# Patient Record
Sex: FEMALE | Race: WHITE | NOT HISPANIC OR LATINO | Employment: UNEMPLOYED
[De-identification: names, ages, dates, MRNs, and addresses within clinical notes are randomized per-mention and may not be internally consistent; named-entity substitution may affect disease eponyms.]

---

## 2017-03-14 ENCOUNTER — RECORDS - HEALTHEAST (OUTPATIENT)
Dept: ADMINISTRATIVE | Facility: OTHER | Age: 52
End: 2017-03-14

## 2018-02-11 ENCOUNTER — COMMUNICATION - HEALTHEAST (OUTPATIENT)
Dept: TELEHEALTH | Facility: CLINIC | Age: 53
End: 2018-02-11

## 2018-02-11 ENCOUNTER — OFFICE VISIT - HEALTHEAST (OUTPATIENT)
Dept: FAMILY MEDICINE | Facility: CLINIC | Age: 53
End: 2018-02-11

## 2018-02-11 DIAGNOSIS — H65.91 RIGHT OTITIS MEDIA WITH EFFUSION: ICD-10-CM

## 2018-04-23 ENCOUNTER — RECORDS - HEALTHEAST (OUTPATIENT)
Dept: ADMINISTRATIVE | Facility: OTHER | Age: 53
End: 2018-04-23

## 2018-04-24 ENCOUNTER — OFFICE VISIT - HEALTHEAST (OUTPATIENT)
Dept: FAMILY MEDICINE | Facility: CLINIC | Age: 53
End: 2018-04-24

## 2018-04-24 ENCOUNTER — HOSPITAL ENCOUNTER (OUTPATIENT)
Dept: MAMMOGRAPHY | Facility: CLINIC | Age: 53
Discharge: HOME | End: 2018-04-24

## 2018-04-24 DIAGNOSIS — Z00.00 ROUTINE HEALTH MAINTENANCE: ICD-10-CM

## 2018-04-24 DIAGNOSIS — N95.1 HOT FLASHES, MENOPAUSAL: ICD-10-CM

## 2018-04-24 DIAGNOSIS — Z12.31 VISIT FOR SCREENING MAMMOGRAM: ICD-10-CM

## 2018-04-24 DIAGNOSIS — Z13.1 SCREENING FOR DIABETES MELLITUS: ICD-10-CM

## 2018-04-24 DIAGNOSIS — Z12.4 SCREENING FOR MALIGNANT NEOPLASM OF CERVIX: ICD-10-CM

## 2018-04-24 DIAGNOSIS — J45.30 MILD PERSISTENT ASTHMA WITHOUT COMPLICATION: ICD-10-CM

## 2018-04-24 DIAGNOSIS — Z13.220 SCREENING FOR LIPID DISORDERS: ICD-10-CM

## 2018-04-24 LAB
ANION GAP SERPL CALCULATED.3IONS-SCNC: 10 MMOL/L (ref 5–18)
BUN SERPL-MCNC: 9 MG/DL (ref 8–22)
CALCIUM SERPL-MCNC: 9.3 MG/DL (ref 8.5–10.5)
CHLORIDE BLD-SCNC: 105 MMOL/L (ref 98–107)
CHOLEST SERPL-MCNC: 145 MG/DL
CO2 SERPL-SCNC: 25 MMOL/L (ref 22–31)
CREAT SERPL-MCNC: 0.69 MG/DL (ref 0.6–1.1)
FASTING STATUS PATIENT QL REPORTED: YES
GFR SERPL CREATININE-BSD FRML MDRD: >60 ML/MIN/1.73M2
GLUCOSE BLD-MCNC: 80 MG/DL (ref 70–125)
HDLC SERPL-MCNC: 65 MG/DL
LDLC SERPL CALC-MCNC: 71 MG/DL
POTASSIUM BLD-SCNC: 3.5 MMOL/L (ref 3.5–5)
SODIUM SERPL-SCNC: 140 MMOL/L (ref 136–145)
TRIGL SERPL-MCNC: 47 MG/DL

## 2018-04-24 RX ORDER — CETIRIZINE HYDROCHLORIDE 10 MG/1
10 TABLET ORAL DAILY
Status: SHIPPED | COMMUNITY
Start: 2018-04-24

## 2018-04-24 RX ORDER — MULTIPLE VITAMINS W/ MINERALS TAB 9MG-400MCG
1 TAB ORAL DAILY
Status: SHIPPED | COMMUNITY
Start: 2018-04-24

## 2018-04-24 ASSESSMENT — MIFFLIN-ST. JEOR: SCORE: 1036.55

## 2018-04-25 LAB
HPV SOURCE: NORMAL
HUMAN PAPILLOMA VIRUS 16 DNA: NEGATIVE
HUMAN PAPILLOMA VIRUS 18 DNA: NEGATIVE
HUMAN PAPILLOMA VIRUS FINAL DIAGNOSIS: NORMAL
HUMAN PAPILLOMA VIRUS OTHER HR: NEGATIVE
SPECIMEN DESCRIPTION: NORMAL

## 2018-04-30 LAB
BKR LAB AP ABNORMAL BLEEDING: NO
BKR LAB AP BIRTH CONTROL/HORMONES: NORMAL
BKR LAB AP CERVICAL APPEARANCE: NORMAL
BKR LAB AP GYN ADEQUACY: NORMAL
BKR LAB AP GYN INTERPRETATION: NORMAL
BKR LAB AP HPV REFLEX: NORMAL
BKR LAB AP LMP: 2016
BKR LAB AP PATIENT STATUS: NORMAL
BKR LAB AP PREVIOUS ABNORMAL: NORMAL
BKR LAB AP PREVIOUS NORMAL: 2016
HIGH RISK?: NO
PATH REPORT.COMMENTS IMP SPEC: NORMAL
RESULT FLAG (HE HISTORICAL CONVERSION): NORMAL

## 2018-12-07 ENCOUNTER — HOSPITAL ENCOUNTER (OUTPATIENT)
Dept: ULTRASOUND IMAGING | Facility: CLINIC | Age: 53
Discharge: HOME | End: 2018-12-07

## 2018-12-07 ENCOUNTER — OFFICE VISIT - HEALTHEAST (OUTPATIENT)
Dept: FAMILY MEDICINE | Facility: CLINIC | Age: 53
End: 2018-12-07

## 2018-12-07 ENCOUNTER — COMMUNICATION - HEALTHEAST (OUTPATIENT)
Dept: FAMILY MEDICINE | Facility: CLINIC | Age: 53
End: 2018-12-07

## 2018-12-07 DIAGNOSIS — N95.0 POSTMENOPAUSAL BLEEDING: ICD-10-CM

## 2018-12-07 DIAGNOSIS — N83.299 COMPLEX OVARIAN CYST: ICD-10-CM

## 2018-12-20 ENCOUNTER — OFFICE VISIT - HEALTHEAST (OUTPATIENT)
Dept: OBGYN | Facility: CLINIC | Age: 53
End: 2018-12-20

## 2018-12-20 DIAGNOSIS — N83.202 LEFT OVARIAN CYST: ICD-10-CM

## 2018-12-20 DIAGNOSIS — N95.0 PMB (POSTMENOPAUSAL BLEEDING): ICD-10-CM

## 2018-12-20 ASSESSMENT — MIFFLIN-ST. JEOR: SCORE: 1066.48

## 2018-12-21 LAB
LAB AP CHARGES (HE HISTORICAL CONVERSION): NORMAL
PATH REPORT.COMMENTS IMP SPEC: NORMAL
PATH REPORT.FINAL DX SPEC: NORMAL
PATH REPORT.GROSS SPEC: NORMAL
PATH REPORT.MICROSCOPIC SPEC OTHER STN: NORMAL
PATH REPORT.RELEVANT HX SPEC: NORMAL
RESULT FLAG (HE HISTORICAL CONVERSION): NORMAL

## 2019-01-25 ENCOUNTER — HOSPITAL ENCOUNTER (OUTPATIENT)
Dept: ULTRASOUND IMAGING | Facility: CLINIC | Age: 54
Discharge: HOME | End: 2019-01-25
Attending: OBSTETRICS & GYNECOLOGY

## 2019-01-25 DIAGNOSIS — N83.202 LEFT OVARIAN CYST: ICD-10-CM

## 2019-04-12 ENCOUNTER — OFFICE VISIT - HEALTHEAST (OUTPATIENT)
Dept: FAMILY MEDICINE | Facility: CLINIC | Age: 54
End: 2019-04-12

## 2019-04-12 DIAGNOSIS — H65.91 OME (OTITIS MEDIA WITH EFFUSION), RIGHT: ICD-10-CM

## 2019-05-09 ENCOUNTER — OFFICE VISIT - HEALTHEAST (OUTPATIENT)
Dept: FAMILY MEDICINE | Facility: CLINIC | Age: 54
End: 2019-05-09

## 2019-05-09 ENCOUNTER — HOSPITAL ENCOUNTER (OUTPATIENT)
Dept: MAMMOGRAPHY | Facility: CLINIC | Age: 54
Discharge: HOME | End: 2019-05-09

## 2019-05-09 DIAGNOSIS — J45.30 MILD PERSISTENT ASTHMA WITHOUT COMPLICATION: ICD-10-CM

## 2019-05-09 DIAGNOSIS — Z12.4 SCREENING FOR MALIGNANT NEOPLASM OF CERVIX: ICD-10-CM

## 2019-05-09 DIAGNOSIS — H91.90 PERCEIVED HEARING CHANGES: ICD-10-CM

## 2019-05-09 DIAGNOSIS — Z13.1 SCREENING FOR DIABETES MELLITUS: ICD-10-CM

## 2019-05-09 DIAGNOSIS — Z00.00 ROUTINE HEALTH MAINTENANCE: ICD-10-CM

## 2019-05-09 DIAGNOSIS — Z12.31 VISIT FOR SCREENING MAMMOGRAM: ICD-10-CM

## 2019-05-09 DIAGNOSIS — Z13.220 SCREENING FOR LIPID DISORDERS: ICD-10-CM

## 2019-05-09 LAB
ANION GAP SERPL CALCULATED.3IONS-SCNC: 9 MMOL/L (ref 5–18)
BUN SERPL-MCNC: 11 MG/DL (ref 8–22)
CALCIUM SERPL-MCNC: 10 MG/DL (ref 8.5–10.5)
CHLORIDE BLD-SCNC: 105 MMOL/L (ref 98–107)
CHOLEST SERPL-MCNC: 144 MG/DL
CO2 SERPL-SCNC: 27 MMOL/L (ref 22–31)
CREAT SERPL-MCNC: 0.67 MG/DL (ref 0.6–1.1)
FASTING STATUS PATIENT QL REPORTED: YES
GFR SERPL CREATININE-BSD FRML MDRD: >60 ML/MIN/1.73M2
GLUCOSE BLD-MCNC: 76 MG/DL (ref 70–125)
HDLC SERPL-MCNC: 63 MG/DL
LDLC SERPL CALC-MCNC: 67 MG/DL
POTASSIUM BLD-SCNC: 3.9 MMOL/L (ref 3.5–5)
SODIUM SERPL-SCNC: 141 MMOL/L (ref 136–145)
TRIGL SERPL-MCNC: 71 MG/DL

## 2019-05-09 ASSESSMENT — MIFFLIN-ST. JEOR: SCORE: 1062.29

## 2019-05-10 LAB
25(OH)D3 SERPL-MCNC: 48.2 NG/ML (ref 30–80)
25(OH)D3 SERPL-MCNC: 48.2 NG/ML (ref 30–80)

## 2019-05-13 ENCOUNTER — HOSPITAL ENCOUNTER (OUTPATIENT)
Dept: ULTRASOUND IMAGING | Facility: CLINIC | Age: 54
Discharge: HOME | End: 2019-05-13

## 2019-05-13 DIAGNOSIS — N63.0 BREAST MASS: ICD-10-CM

## 2019-05-13 LAB
BKR LAB AP ABNORMAL BLEEDING: NO
BKR LAB AP BIRTH CONTROL/HORMONES: NORMAL
BKR LAB AP CERVICAL APPEARANCE: NORMAL
BKR LAB AP GYN ADEQUACY: NORMAL
BKR LAB AP GYN INTERPRETATION: NORMAL
BKR LAB AP HPV REFLEX: NORMAL
BKR LAB AP LMP: NORMAL
BKR LAB AP PATIENT STATUS: NORMAL
BKR LAB AP PREVIOUS ABNORMAL: NORMAL
BKR LAB AP PREVIOUS NORMAL: 2018
HIGH RISK?: NO
PATH REPORT.COMMENTS IMP SPEC: NORMAL
RESULT FLAG (HE HISTORICAL CONVERSION): NORMAL

## 2019-05-16 ENCOUNTER — COMMUNICATION - HEALTHEAST (OUTPATIENT)
Dept: FAMILY MEDICINE | Facility: CLINIC | Age: 54
End: 2019-05-16

## 2019-06-03 ENCOUNTER — RECORDS - HEALTHEAST (OUTPATIENT)
Dept: RADIOLOGY | Facility: CLINIC | Age: 54
End: 2019-06-03

## 2019-06-03 ENCOUNTER — RECORDS - HEALTHEAST (OUTPATIENT)
Dept: ADMINISTRATIVE | Facility: OTHER | Age: 54
End: 2019-06-03

## 2020-08-06 ENCOUNTER — OFFICE VISIT - HEALTHEAST (OUTPATIENT)
Dept: FAMILY MEDICINE | Facility: CLINIC | Age: 55
End: 2020-08-06

## 2020-08-06 ENCOUNTER — HOSPITAL ENCOUNTER (OUTPATIENT)
Dept: MAMMOGRAPHY | Facility: CLINIC | Age: 55
Discharge: HOME | End: 2020-08-06

## 2020-08-06 DIAGNOSIS — Z13.220 SCREENING FOR LIPID DISORDERS: ICD-10-CM

## 2020-08-06 DIAGNOSIS — J45.30 MILD PERSISTENT ASTHMA WITHOUT COMPLICATION: ICD-10-CM

## 2020-08-06 DIAGNOSIS — Z00.00 ROUTINE HEALTH MAINTENANCE: ICD-10-CM

## 2020-08-06 DIAGNOSIS — Z12.31 SCREENING MAMMOGRAM, ENCOUNTER FOR: ICD-10-CM

## 2020-08-06 DIAGNOSIS — H91.90 PERCEIVED HEARING CHANGES: ICD-10-CM

## 2020-08-06 DIAGNOSIS — Z13.1 SCREENING FOR DIABETES MELLITUS: ICD-10-CM

## 2020-08-06 DIAGNOSIS — Z12.83 SKIN EXAM, SCREENING FOR CANCER: ICD-10-CM

## 2020-08-06 DIAGNOSIS — Z12.4 SCREENING FOR MALIGNANT NEOPLASM OF CERVIX: ICD-10-CM

## 2020-08-06 LAB
ANION GAP SERPL CALCULATED.3IONS-SCNC: 8 MMOL/L (ref 5–18)
BUN SERPL-MCNC: 11 MG/DL (ref 8–22)
CALCIUM SERPL-MCNC: 9.5 MG/DL (ref 8.5–10.5)
CHLORIDE BLD-SCNC: 103 MMOL/L (ref 98–107)
CHOLEST SERPL-MCNC: 165 MG/DL
CO2 SERPL-SCNC: 30 MMOL/L (ref 22–31)
CREAT SERPL-MCNC: 0.7 MG/DL (ref 0.6–1.1)
FASTING STATUS PATIENT QL REPORTED: YES
GFR SERPL CREATININE-BSD FRML MDRD: >60 ML/MIN/1.73M2
GLUCOSE BLD-MCNC: 79 MG/DL (ref 70–125)
HDLC SERPL-MCNC: 62 MG/DL
LDLC SERPL CALC-MCNC: 90 MG/DL
POTASSIUM BLD-SCNC: 3.9 MMOL/L (ref 3.5–5)
SODIUM SERPL-SCNC: 141 MMOL/L (ref 136–145)
TRIGL SERPL-MCNC: 66 MG/DL

## 2020-08-06 RX ORDER — BUDESONIDE AND FORMOTEROL FUMARATE DIHYDRATE 80; 4.5 UG/1; UG/1
2 AEROSOL RESPIRATORY (INHALATION) DAILY
Qty: 1 INHALER | Refills: 12 | Status: SHIPPED | OUTPATIENT
Start: 2020-08-06 | End: 2021-09-30

## 2020-08-06 ASSESSMENT — MIFFLIN-ST. JEOR: SCORE: 1067.4

## 2020-08-07 ENCOUNTER — COMMUNICATION - HEALTHEAST (OUTPATIENT)
Dept: ADMINISTRATIVE | Facility: CLINIC | Age: 55
End: 2020-08-07

## 2020-08-18 ENCOUNTER — COMMUNICATION - HEALTHEAST (OUTPATIENT)
Dept: FAMILY MEDICINE | Facility: CLINIC | Age: 55
End: 2020-08-18

## 2020-09-15 ENCOUNTER — OFFICE VISIT - HEALTHEAST (OUTPATIENT)
Dept: AUDIOLOGY | Facility: CLINIC | Age: 55
End: 2020-09-15

## 2020-09-15 ENCOUNTER — RECORDS - HEALTHEAST (OUTPATIENT)
Dept: ADMINISTRATIVE | Facility: OTHER | Age: 55
End: 2020-09-15

## 2020-09-15 DIAGNOSIS — H93.8X1 SENSATION OF FULLNESS IN RIGHT EAR: ICD-10-CM

## 2020-09-15 DIAGNOSIS — H90.41 SENSORINEURAL HEARING LOSS (SNHL) OF RIGHT EAR WITH UNRESTRICTED HEARING OF LEFT EAR: ICD-10-CM

## 2020-10-01 ENCOUNTER — OFFICE VISIT - HEALTHEAST (OUTPATIENT)
Dept: OTOLARYNGOLOGY | Facility: CLINIC | Age: 55
End: 2020-10-01

## 2020-10-01 DIAGNOSIS — J31.0 CHRONIC RHINITIS: ICD-10-CM

## 2020-10-01 DIAGNOSIS — H90.41 SENSORINEURAL HEARING LOSS (SNHL) OF RIGHT EAR WITH UNRESTRICTED HEARING OF LEFT EAR: ICD-10-CM

## 2020-10-01 RX ORDER — AZELASTINE 1 MG/ML
SPRAY, METERED NASAL
Qty: 30 ML | Refills: 12 | Status: SHIPPED | OUTPATIENT
Start: 2020-10-01

## 2020-10-30 ENCOUNTER — COMMUNICATION - HEALTHEAST (OUTPATIENT)
Dept: ADMINISTRATIVE | Facility: CLINIC | Age: 55
End: 2020-10-30

## 2020-11-04 ENCOUNTER — OFFICE VISIT - HEALTHEAST (OUTPATIENT)
Dept: AUDIOLOGY | Facility: CLINIC | Age: 55
End: 2020-11-04

## 2020-11-04 DIAGNOSIS — H90.3 SENSORINEURAL HEARING LOSS, BILATERAL: ICD-10-CM

## 2020-11-15 ENCOUNTER — VIRTUAL VISIT (OUTPATIENT)
Dept: FAMILY MEDICINE | Facility: OTHER | Age: 55
End: 2020-11-15
Payer: COMMERCIAL

## 2020-11-15 ENCOUNTER — AMBULATORY - HEALTHEAST (OUTPATIENT)
Dept: FAMILY MEDICINE | Facility: CLINIC | Age: 55
End: 2020-11-15

## 2020-11-15 DIAGNOSIS — Z20.822 SUSPECTED COVID-19 VIRUS INFECTION: ICD-10-CM

## 2020-11-15 PROCEDURE — 99421 OL DIG E/M SVC 5-10 MIN: CPT | Performed by: FAMILY MEDICINE

## 2020-11-16 NOTE — PROGRESS NOTES
"Date: 11/15/2020 07:51:49  Clinician: Annamaria Perez  Clinician NPI: [id number]  Patient: Hannah Reyna  Patient : 1965  Patient Address: [address] [place] [address]  Patient Phone: [phone]  Visit Protocol: URI  Patient Summary:  Hannah is a 55 year old ( : 1965 ) female who initiated a [facility] Visit for COVID-19 (Coronavirus) evaluation and screening. When asked the question \"Please sign me up to receive news, health information and promotions from [facility].\", Hannah responded \"Yes\".    Hannah states her symptoms started 1-2 days ago.   Her symptoms consist of a sore throat.   Symptom details   Sore throat: Hannah reports having mild throat pain (1-3 on a 10 point pain scale), does not have exudate on her tonsils, and can swallow liquids. The lymph nodes in her neck are not enlarged. A rash has not appeared on the skin since the sore throat started.    Hannah denies having vomiting, rhinitis, facial pain or pressure, myalgias, chills, malaise, teeth pain, ageusia, diarrhea, ear pain, headache, wheezing, fever, enlarged lymph nodes, cough, nasal congestion, nausea, and anosmia. She also denies taking antibiotic medication in the past month and having recent facial or sinus surgery in the past 60 days. She is not experiencing dyspnea.   Precipitating events  Within the past week, Hannah has not been exposed to someone with strep throat.   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 In the past 14 days, Hannah has worked or volunteered at a hospital or a clinic. 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   Hannah has not had a close contact with a laboratory-confirmed COVID-19 patient within 14 days of symptom onset.    Since " December 2019, Hannah has not been tested for COVID-19 and has not had upper respiratory infection or influenza-like illness.   Pertinent medical history  Hannah does not get yeast infections when she takes antibiotics.   Hannah needs a return to work/school note.   Weight: 120 lbs   Hannah does not smoke or use smokeless tobacco.   Weight: 120 lbs    MEDICATIONS: Daily Multivitamin-Minerals oral, Vitamin D3 oral, azelastine nasal, ALLERGIES: Penicillins  Clinician Response:  Dear Hannah,   Your symptoms show that you may have coronavirus (COVID-19). This illness can cause fever, cough and trouble breathing. Many people get a mild case and get better on their own. Some people can get very sick.  What should I do?  We would like to test you for this virus.   1. Please call [phone] to schedule your visit. Explain that you were referred by [facility] to have a COVID-19 test. Be ready to share your [facility] visit ID number.  * If you need to schedule in [facility] please call [phone] or for [employer] employees please call [phone].  * If you need to schedule in the [place] area please call [phone]. [place] employees call [phone].  The following will serve as your written order for this COVID Test, ordered by me, for the indication of suspected COVID [Z20.828]: The test will be ordered in [employer], our electronic health record, after you are scheduled. It will show as ordered and authorized by Adrian Coleman MD.  Order: COVID-19 (Coronavirus) PCR for SYMPTOMATIC testing from [facility].   2. When it's time for your COVID test:  Stay at least 6 feet away from others. (If someone will drive you to your test, stay in the backseat, as far away from the  as you can.)   Cover your mouth and nose with a mask, tissue or washcloth.  Go straight to the testing site. Don't make any stops on the way there or back.      3.Starting now: Stay home and away from others (self-isolate) until:   You've had no fever---and no medicine that  "reduces fever---for one full day (24 hours). And...   Your other symptoms have gotten better. For example, your cough or breathing has improved. And...   At least 10 days have passed since your symptoms started.       During this time, don't leave the house except for testing or medical care.   Stay in your own room, even for meals. Use your own bathroom if you can.   Stay away from others in your home. No hugging, kissing or shaking hands. No visitors.  Don't go to work, school or anywhere else.    Clean \"high touch\" surfaces often (doorknobs, counters, handles, etc.). Use a household cleaning spray or wipes. You'll find a full list of  on the EPA website: www.epa.gov/pesticide-registration/list-n-disinfectants-use-against-sars-cov-2.   Cover your mouth and nose with a mask, tissue or washcloth to avoid spreading germs.  Wash your hands and face often. Use soap and water.  Caregivers in these groups are at risk for severe illness due to COVID-19:  o People 65 years and older  o People who live in a nursing home or long-term care facility  o People with chronic disease (lung, heart, cancer, diabetes, kidney, liver, immunologic)  o People who have a weakened immune system, including those who:   Are in cancer treatment  Take medicine that weakens the immune system, such as corticosteroids  Had a bone marrow or organ transplant  Have an immune deficiency  Have poorly controlled HIV or AIDS  Are obese (body mass index of 40 or higher)  Smoke regularly   o Caregivers should wear gloves while washing dishes, handling laundry and cleaning bedrooms and bathrooms.  o Use caution when washing and drying laundry: Don't shake dirty laundry, and use the warmest water setting that you can.  o For more tips, go to www.cdc.gov/coronavirus/2019-ncov/downloads/10Things.pdf.    4.Sign up for [employer]. We know it's scary to hear that you might have COVID-19. We want to track your symptoms to make sure you're okay over the " next 2 weeks. Please look for an email from [employer]---this is a free, online program that we'll use to keep in touch. To sign up, follow the link in the email. Learn more at [email or web address]  How can I take care of myself?   Get lots of rest. Drink extra fluids (unless a doctor has told you not to).   Take Tylenol (acetaminophen) for fever or pain. If you have liver or kidney problems, ask your family doctor if it's okay to take Tylenol.   Adults can take either:    650 mg (two 325 mg pills) every 4 to 6 hours, or...   1,000 mg (two 500 mg pills) every 8 hours as needed.    Note: Don't take more than 3,000 mg in one day. Acetaminophen is found in many medicines (both prescribed and over-the-counter medicines). Read all labels to be sure you don't take too much.   For children, check the Tylenol bottle for the right dose. The dose is based on the child's age or weight.    If you have other health problems (like cancer, heart failure, an organ transplant or severe kidney disease): Call your specialty clinic if you don't feel better in the next 2 days.       Know when to call 911. Emergency warning signs include:    Trouble breathing or shortness of breath Pain or pressure in the chest that doesn't go away Feeling confused like you haven't felt before, or not being able to wake up Bluish-colored lips or face.  Where can I get more information?   [facility] -- About COVID-19: [email or web address]   CDC -- What to Do If You're Sick: www.cdc.gov/coronavirus/2019-ncov/about/steps-when-sick.html   CDC -- Ending Home Isolation: www.cdc.gov/coronavirus/2019-ncov/hcp/disposition-in-home-patients.html   CDC -- Caring for Someone: www.cdc.gov/coronavirus/2019-ncov/if-you-are-sick/care-for-someone.html   [facility] -- Interim Guidance for Hospital Discharge to Home: www.health.[facility].[place]./diseases/coronavirus/hcp/hospdischarge.pdf   [facility] clinical trials (COVID-19 research  studies): clinicalaffairs.[facility]-clinical-trials    Below are the COVID-19 hotlines at the [place] Department of Health ([facility]). Interpreters are available.    For health questions: Call 943-481-7522 or 1-477.863.6992 (7 a.m. to 7 p.m.) For questions about schools and childcare: Call 688-534-9034 or 1-548.403.7247 (7 a.m. to 7 p.m.)    Diagnosis: Contact with and (suspected) exposure to other viral communicable diseases  Diagnosis ICD: Z20.828

## 2020-11-17 ENCOUNTER — COMMUNICATION - HEALTHEAST (OUTPATIENT)
Dept: SCHEDULING | Facility: CLINIC | Age: 55
End: 2020-11-17

## 2020-11-19 ENCOUNTER — OFFICE VISIT - HEALTHEAST (OUTPATIENT)
Dept: AUDIOLOGY | Facility: CLINIC | Age: 55
End: 2020-11-19

## 2020-11-19 DIAGNOSIS — H90.41 SENSORINEURAL HEARING LOSS (SNHL) OF RIGHT EAR WITH UNRESTRICTED HEARING OF LEFT EAR: ICD-10-CM

## 2021-01-05 ENCOUNTER — OFFICE VISIT - HEALTHEAST (OUTPATIENT)
Dept: AUDIOLOGY | Facility: CLINIC | Age: 56
End: 2021-01-05

## 2021-01-05 DIAGNOSIS — H90.41 SENSORINEURAL HEARING LOSS (SNHL) OF RIGHT EAR WITH UNRESTRICTED HEARING OF LEFT EAR: ICD-10-CM

## 2021-02-24 ENCOUNTER — OFFICE VISIT - HEALTHEAST (OUTPATIENT)
Dept: AUDIOLOGY | Facility: CLINIC | Age: 56
End: 2021-02-24

## 2021-02-24 DIAGNOSIS — H90.41 SENSORINEURAL HEARING LOSS (SNHL) OF RIGHT EAR WITH UNRESTRICTED HEARING OF LEFT EAR: ICD-10-CM

## 2021-03-11 ENCOUNTER — OFFICE VISIT - HEALTHEAST (OUTPATIENT)
Dept: AUDIOLOGY | Facility: CLINIC | Age: 56
End: 2021-03-11

## 2021-03-11 DIAGNOSIS — H90.41 SENSORINEURAL HEARING LOSS (SNHL) OF RIGHT EAR WITH UNRESTRICTED HEARING OF LEFT EAR: ICD-10-CM

## 2021-04-01 ENCOUNTER — OFFICE VISIT - HEALTHEAST (OUTPATIENT)
Dept: AUDIOLOGY | Facility: CLINIC | Age: 56
End: 2021-04-01

## 2021-04-01 DIAGNOSIS — H90.41 SENSORINEURAL HEARING LOSS (SNHL) OF RIGHT EAR WITH UNRESTRICTED HEARING OF LEFT EAR: ICD-10-CM

## 2021-04-13 ENCOUNTER — OFFICE VISIT - HEALTHEAST (OUTPATIENT)
Dept: FAMILY MEDICINE | Facility: CLINIC | Age: 56
End: 2021-04-13

## 2021-04-13 ENCOUNTER — AMBULATORY - HEALTHEAST (OUTPATIENT)
Dept: FAMILY MEDICINE | Facility: CLINIC | Age: 56
End: 2021-04-13

## 2021-04-13 DIAGNOSIS — Z20.822 SUSPECTED COVID-19 VIRUS INFECTION: ICD-10-CM

## 2021-04-15 ENCOUNTER — COMMUNICATION - HEALTHEAST (OUTPATIENT)
Dept: SCHEDULING | Facility: CLINIC | Age: 56
End: 2021-04-15

## 2021-05-27 NOTE — PROGRESS NOTES
Assessment/Plan:     1. OME (otitis media with effusion), right  Continue Zyrtec once daily.  Start Flonase nasal spray.  If no improvement over the next 2-3 days, start the Cefdinir.  Continue warm compresses.  Tylenol and/or ibuprofen as needed.  Follow up next week with any new/worsening symptoms.   - cefdinir (OMNICEF) 300 MG capsule; Take 1 capsule (300 mg total) by mouth 2 (two) times a day for 7 days.  Dispense: 14 capsule; Refill: 0  - fluticasone propionate (FLONASE) 50 mcg/actuation nasal spray; 2 sprays into each nostril daily.  Dispense: 16 g; Refill: 0        Subjective:     Hannah Reyna is a 53 y.o. female who presents complaints of right ear pain and fullness times 2 days.  Associated symptoms include sinus congestion.  Denies any fever, sore throat, cough, or left ear pain.  She does have a history of recurrent right ear infections.  Last treated over one year ago.  Patient is using Tylenol, ibuprofen, and heat application.  She does use Zyrtec year-round.  On budesonide for her asthma, which she reports is well controlled.  No new cough or wheezing.  Patient has responded well to antibiotics as well as steroids in the past she does feel that her symptoms have worsened since onset.      The following portions of the patient's history were reviewed and updated as appropriate: allergies, current medications.        Objective:     /60   Pulse 76   Temp 98.1  F (36.7  C) (Oral)   Wt 127 lb (57.6 kg)   LMP 12/06/2018   BMI 25.22 kg/m      General Appearance: Alert, cooperative, no distress, appears stated age  Ears: Left TM and canal normal.  Right TM bulging, and dull.  Poor light reflex.  Nose: Nares normal, septum midline  Throat: Lips, mucosa, and tongue normal; teeth and gums normal. Normal posterior pharynx.  Neck: Supple, symmetrical, trachea midline, no adenopathy  Lungs: Clear to auscultation bilaterally, respirations unlabored  Heart: Regular rate and rhythm, S1 and S2 normal, no  murmur, rub, or gallop    Anita Miranda, NP

## 2021-05-28 ASSESSMENT — ASTHMA QUESTIONNAIRES: ACT_TOTALSCORE: 25

## 2021-05-28 NOTE — PROGRESS NOTES
FEMALE PREVENTATIVE EXAM    Assessment and Plan:       1. Routine health maintenance  Healthy female exam  - Vitamin D, Total (25-Hydroxy)    2. Mild persistent asthma without complication  Well controlled.  ACT score of 25.  Continue Symbicort once daily.  Asthma management plan   - budesonide-formoterol (SYMBICORT) 80-4.5 mcg/actuation inhaler; Inhale 2 puffs daily.  Dispense: 1 Inhaler; Refill: 12    3. Screening for diabetes mellitus  - Basic Metabolic Panel    4. Screening for lipid disorders  - Lipid [place] FASTING    5. Screening for malignant neoplasm of cervix  - Gynecologic Cytology (PAP Smear)    6. Perceived hearing changes  Refer to audiology for a formal hearing exam  - Ambulatory referral to Audiology     Next follow up:  Return in about 1 year (around 5/9/2020) for Physical.    Immunization Review  Adult Imm Review: No immunizations due today    I discussed the following with the patient:   Adult Healthy Living: Importance of regular exercise  Healthy nutrition      Subjective:   Chief Complaint: Hannah Reyna is an 53 y.o. female here for a preventative health visit.     HPI:  Patient is single with an adult daughter.  She is working at [employer]  Things are going well.    Patient had abnormal vaginal bleeding earlier this year.  Referred to OBGYN for an endometrial biopsy, which was negative.  An ovarian cyst found on US has resolved.  Denies any subsequent bleeding.  Some night sweats.  Patient would like to repeat her pap this year, even though she had a normal pap last year.    Reports issues with hearing loss.  She has a hard time hearing patients unless she is facing them.  Hard time hearing when there are loud background noises.    History of persistent asthma.  Triggers include mold, exercise, pollen, and animal dander.  Reports good control with Symbicort once daily.  Does not utilize albuterol.  No cough, wheezing, CP, or shortness of breath.     Had a mammogram this morning.      Healthy  "Habits  Are you taking a daily aspirin? No  Do you typically exercising at least 40 min, 3-4 times per week?  Yes  Do you usually eat at least 4 servings of fruit and vegetables a day, include whole grains and fiber and avoid regularly eating high fat foods? Yes  Have you had an eye exam in the past two years? Yes  Do you see a dentist twice per year? Yes  Do you have any concerns regarding sleep? No    Safety Screen  If you own firearms, are they secured in a locked gun cabinet or with trigger locks? The patient does not own any firearms  Do you feel you are safe where you are living?: Yes (5/9/2019  9:14 AM)  Do you feel you are safe in your relationship(s)?: Yes (5/9/2019  9:14 AM)      Review of Systems:  Please see above.  The rest of the review of systems are negative for all systems.     Pap History:   Yes - updated in Problem List and Health Maintenance accordingly  Cancer Screening       Status Date      MAMMOGRAM Overdue 4/24/2019      Done 4/24/2018      Patient has more history with this topic...    PAP SMEAR Next Due 4/24/2023      Done 4/24/2018 GYNECOLOGIC CYTOLOGY (PAP SMEAR)     Patient has more history with this topic...    COLONOSCOPY Next Due 4/23/2028      Done 4/23/2018      Patient has more history with this topic...          Patient Care Team:  Anita Miranda NP as PCP - General (Family Medicine)        History     Reviewed By Date/Time Sections Reviewed    Anita Miranda NP 5/9/2019  9:26 AM Tobacco    Anita Miranda NP 5/9/2019  9:23 AM Tobacco    Leslye Stovall MA 5/9/2019  9:14 AM Tobacco            Objective:   Vital Signs:   Visit Vitals  /64   Pulse 64   Temp 97.9  F (36.6  C)   Ht 4' 11.75\" (1.518 m)   Wt 121 lb 3.2 oz (55 kg)   LMP 12/06/2018   BMI 23.87 kg/m           PHYSICAL EXAM  General Appearance: Alert, cooperative, no distress, appears stated age  Head: Normocephalic, without obvious abnormality, atraumatic. Wearing corrective glasses.  Eyes: PERRL, " conjunctiva/corneas clear, EOM's intact  Ears: Normal TM's and external ear canals, both ears  Nose: Nares normal, septum midline  Throat: Lips, mucosa, and tongue normal; teeth and gums normal  Neck: Supple, symmetrical, trachea midline, no adenopathy;  thyroid: not enlarged, symmetric, no tenderness/mass/nodules  Back: no CVA tenderness  Lungs: Clear to auscultation bilaterally, respirations unlabored  Breasts: No breast masses, tenderness, asymmetry, or nipple discharge.  Heart: Regular rate and rhythm, S1 and S2 normal, no murmur, rub, or gallop  Abdomen: Soft, non-tender, bowel sounds active all four quadrants, no masses, no organomegaly  Pelvic: Normally developed genitalia with no external lesions or eruptions. Vagina and cervix show no lesions, inflammation, discharge or tenderness. No cystocele, No rectocele. No adnexal mass or tenderness.  Extremities: Extremities normal, atraumatic, no cyanosis or edema  Skin: Skin color, texture, turgor normal, no rashes or lesions  Lymph nodes: Cervical, supraclavicular, and axillary nodes normal  Neurologic: Normal   Psychologic: appropriate affective, answers all of my questions appropriately. No hallucinations, delusion, or suicidal ideations.    Anita Miranda NP

## 2021-06-01 VITALS — WEIGHT: 116.4 LBS | HEIGHT: 60 IN | BODY MASS INDEX: 22.85 KG/M2

## 2021-06-01 VITALS — WEIGHT: 122.8 LBS

## 2021-06-02 VITALS — WEIGHT: 127 LBS | BODY MASS INDEX: 25.22 KG/M2

## 2021-06-02 VITALS — WEIGHT: 123 LBS | HEIGHT: 60 IN | BODY MASS INDEX: 24.15 KG/M2

## 2021-06-02 VITALS — WEIGHT: 125.4 LBS | BODY MASS INDEX: 24.9 KG/M2

## 2021-06-03 VITALS — WEIGHT: 121.2 LBS | HEIGHT: 60 IN | BODY MASS INDEX: 23.8 KG/M2

## 2021-06-04 VITALS
WEIGHT: 119.7 LBS | BODY MASS INDEX: 22.6 KG/M2 | DIASTOLIC BLOOD PRESSURE: 64 MMHG | SYSTOLIC BLOOD PRESSURE: 102 MMHG | HEIGHT: 61 IN | HEART RATE: 74 BPM

## 2021-06-11 NOTE — PROGRESS NOTES
CHIEF COMPLAINT:   Chief Complaint   Patient presents with     Hearing Loss     Right side hearing loss x years. Does not wear hearing aids.     Ear Pain     Intermittent ear pain with ear infections.         HISTORY OF PRESENT ILLNESS    Hannah was seen at the behest of Anita Miranda for hearing loss.  She is a nurse with [employer].  She has a hard time understanding others at work.  No dizziness or balance issues.  No ringing.  Gets 2 infection a year (like clockwork) in Spring and Fall, normally treated with antibiotics.   History of sinus congestion, sinus pain, worse on right.   Allergy eval.  30 years ago.  Takes zyrtec daily.  Recently started taking flonase daily    Chief Complaint   Patient presents with     Hearing Loss     Right side hearing loss x years. Does not wear hearing aids.     Ear Pain     Intermittent ear pain with ear infections.            REVIEW OF SYSTEMS    Review of Systems: a 10-system review is reviewed at this encounter.  See scanned document.     Penicillins     PHYSICAL EXAM:        HEAD: Normal appearance and symmetry:  No cutaneous lesions.      EARS:    Normal TM's bilaterally. Normal auditory canals and external ears. Non-tender.         NOSE:    Dorsum:   straight  Septum:  Normal  Mucosa:  moist  Inferior turbinates:  2+       ORAL CAVITY/OROPHARYNX:    Lips:  Normal.  Tongue: normal, midline  Mucosa:   no lesions  Tonsils:  2+      NECK:  Trachea:  midline.   Thyroid:  normal   Adenopathy:  none       NEURO:   Alert and Oriented    GAIT AND STATION:  normal     RESPIRATORY:   Symmetry and Respiratory effort         IMPRESSION:    Encounter Diagnoses   Name Primary?     Chronic rhinitis Yes     Sensorineural hearing loss (SNHL) of right ear with unrestricted hearing of left ear           RECOMMENDATIONS:      Orders Placed This Encounter   Procedures     Ambulatory referral to Audiology     Referral Priority:   Routine     Referral Type:   Audiology     Referral Reason:   Evaluation  and Treatment     Requested Specialty:   Audiology     Number of Visits Requested:   1      Medications Ordered   Medications     azelastine (ASTELIN) 137 mcg (0.1 %) nasal spray     Sig: Use in each nostril as directed     Dispense:  30 mL     Refill:  12     2 sprays each nostril 1-2x daily as needed for nasal congestion (use opposite hand)

## 2021-06-11 NOTE — PATIENT INSTRUCTIONS - HE
You have some mild hearing loss on the right side  Recommend Hearing aid consultation (1 hour visit)  Recommend ABR test to look at function of hearing nerves  Repeat hearing test in 6 months  Astelin nasal spray for congestion, use flonase only as supplement)

## 2021-06-12 NOTE — PROGRESS NOTES
Audiology Report:  Neurodiagnostic Auditory Brainstem Response (ABR) Evaluation     Referring Provider:  Dr. Campbell     SUBJECTIVE:  Hannah Clark is seen today for a neurodiagnostic Auditory Brainstem Response (ABR). She has a history of right aural fullness. Her hearing was last tested on 9/15/20 and results revealed normal hearing from 250-6000 Hz sloping to mild likely sensorineural hearing loss at 8000 Hz in the left ear and normal sloping to mild sensorineural hearing loss in the right ear.     Today Hannah reports that her left ear has been popping and she is having a little difficulty hearing from the left ear since she was seen by Dr. Campbell on 10/1/20.    OBJECTIVE: Otoscopy revealed clear canals bilaterally. Tympanometry revealed normal middle ear mobility bilaterally.     Unsedated Neurodiagnostic ABR completed on the [employer] Eclipse EP-25 system.      A high level click (90 dBnHL) was completed in both rarefaction and condensation polarities at a rate of 21.1/sec, 11.1/sec, and 71.1/sec for both ears. Good morphology was noted indicating good neural synchrony. Interaural interpeak latencies (I-III and I-V) were examined and found to be less than 0.40 ms for waveforms at every rate for both ears.      Waveform Latencies for Rate of 21.1/second                            Right Left   I 1.30 1.43   III 3.50 3.50   V 5.37 5.67   I-III 2.20 2.07   I-V 4.07 4.23      Waveform Latencies for Rate of 11.1/second                            Right Left   I 1.33 1.37   III 3.60 3.53   V 5.27 5.63   I-III 2.27 2.17   I-V 3.93 4.27      Waveform Latencies for Rate of 71.1/second                            Right Left   I 1.30 1.33   III 3.60 3.63   V 5.70 5.83   I-III 2.30 2.30   I-V 4.40 4.50      Transducer:  Insert earphones (foam inserts)     ASSESSMENT: Today's results suggest that there is no evidence of retrocochlear abnormality present.      PLAN: These results will be shared with Dr. Campbell. Hannah is scheduled to  return for a hearing test and ENT visit in 6 months.      Carlie Sinclair, [facility]  [place] Licensed Audiologist #[id number]

## 2021-06-13 NOTE — PROGRESS NOTES
AUDIOLOGY REPORT    SUBJECTIVE: Hannah Reyna, 55 y.o.  year old female, was seen on 11/19/20 for a hearing aid evaluation. Her hearing was assessed on 9/15/2020 and results revealed normal sloping to mild rising to normal hearing in the right ear and normal hearing in the left ear. Medical clearance was provided by Dr. Campbell. Hannah had a Neurodiagnostic Auditory Brainstem Response (ABR) on 11/04/2020 and results were normal and did not indicate a possible retrocochlear lesion.     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     OBJECTIVE: Manufacturers, styles, monaural vs binaural fittings, trial period, technology levels, and realistic expectations were discussed. Hannah would like STALIN hearing aids.    Hearing Aids - Right  : Phonak  Model:   [employer]   Style:   STAILN  : 0xS    ASSESSMENT: Reviewed purchase information and warranty information with patient. The 45 day trial period was explained to patient. The patient was given a copy of the [place] Department of Health consumer brochure on purchasing hearing instruments. Patient risk factors have been provided to the patient in writing prior to the sale of the hearing aid per FDA regulation. The risk factors are also available in the User Instructional Booklet to be presented on the day of the hearing aid fitting. Hearing aid(s) ordered. Hearing aid evaluation completed.      PLAN: Hannah will return in 6 weeks for a hearing aid fitting. Please contact our clinic at [phone] with questions or concerns.    Carlie Cheema, [facility]  Clinical Audiologist  [place] #[id number]

## 2021-06-14 NOTE — PROGRESS NOTES
AUDIOLOGY REPORT    SUBJECTIVE: Hannah Reyna, 55 y.o.  year old female, was seen on 01/05/21 for a hearing aid fitting. Her hearing was assessed on 9/15/2020 and results revealed normal sloping to mild rising to normal hearing in the right ear and normal hearing in the left ear. Medical clearance was provided by Dr. Campbell. Hannah had a Neurodiagnostic Auditory Brainstem Response (ABR) on 11/04/2020 and results were normal and did not indicate a possible retrocochlear lesion.     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     OBJECTIVE:     Hearing Aids  : [employer] Systems  Model:  Askuityeo   Style:   STALIN  Dome:  Small Cap  : 0xS  SN:   [id number] (R)  Warranty: 02/16/2023    Real ear measures were performed using the Audioscan Heroicifit probe-tube microphone system and the NAL-NL1 prescriptive targets. Gain was adjusted to obtain a closer match to prescriptive targets for soft, average, and loud inputs. Maximum output was measured and was within acceptable limits and patient tolerance.    Speech Intelligibility Index (SII)  The speech intelligibility index (SII) estimates the amount of audible speech at different presentation levels through the hearing aids. The following SII values represent audibility for average inputs:  Unaided SII: 67 (R)  Aided SII: 94 (R)    The volume control is enabled. Noise program provided to Hannah to try at [employer] when it is very loud. SoundRecover is deactivated. Gain was set to 100% target. Hearing aid paired to phone and Nyla.    Hannah reports satisfaction with the fit and sound quality of the instruments.  Use, care, trial period and realistic expectations were reviewed in detail.  Hannah is able to demonstrate independent manipulation of the instruments with battery care and insertion/removal.     ASSESSMENT: Hearing aids fit. Purchase agreement signed. Cerushields will be reviewed at next appointment.    PLAN: Hannah will return in 3 weeks for hearing aid follow  up. Please contact our clinic at [phone] with questions or concerns.    Carlie Cheema, [facility]  Clinical Audiologist  [place] #[id number]

## 2021-06-15 NOTE — PROGRESS NOTES
AUDIOLOGY REPORT    SUBJECTIVE:  Hannah Reyna, 55 y.o. year old female, was seen on 02/24/21 to fit a right Phonak M50-R -in-the-ear hearing aid.  Her hearing was assessed on 9/15/2020 and results revealed normal sloping to mild rising to normal hearing in the right ear and normal hearing in the left ear. Medical clearance was provided by Dr. Campbell. Hannah had a Neurodiagnostic Auditory Brainstem Response (ABR) on 11/04/2020 and results were normal and did not indicate a possible retrocochlear lesion. She was fit with a right Phonak  STALIN hearing aid on 1/5/2021. She was unhappy with the battery life and exchanged the device on 2/24/21, while still in her trial period for the rechargeable option (Phonak M50-R). She returns today for the fitting of the right device.     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 At her previous appointment, Hannah reported that the hearing aid was helpful while she is working in the infusion unit, yet it is not helpful in reducing noise when she works in the endoscopy unit.       OBJECTIVE:   Hearing Aids  : Phonak  Model:  Audeo M50-R  Style: -in-the-ear  Dome:Small Cap  : 0xS  SN: [id number] (R)  Warranty:05/24/2023    Programs:  1. Autosense  2. Comfort in noise  3. Speech in noise    Hannah reports satisfaction with the fit and sound quality of the instruments. Hannah's hearing aid was programmed using the settings from her previous non-rechargeable hearing aid. A comfort in noise program was added to help with noise while she is in the endoscopy unit. The push button is activated for volume and program changes, however Hannah prefers to use her phone.     Use, care, trial period and realistic expectations were reviewed again in detail. Hannah was informed that her trial period will start over. Hannah was shown how to use the  and change wax filters. Her new hearing aid was successfully  paired to her phone and the SDI-Solution radha. She was encouraged to try the different programs and make adjustments to the equalizer on her phone to see if it is helpful in different work environments.     ASSESSMENT: Hearing aid fit. Purchase agreement signed. There is no charge for this appointment as Hannah is doing an exchange for the same technology level that was previously billed.     PLAN: Hannah will return in 2-3 weeks for hearing aid follow up. At this time we can make any other adjustments or changes needed. Please contact our clinic at [phone] with questions or concerns.    Carlie Baldwin, [facility]  Clinical Audiologist  [place] #[id number]

## 2021-06-15 NOTE — PROGRESS NOTES
AUDIOLOGY REPORT    SUBJECTIVE: Hannah Reyna, 55 y.o. year old female, was seen on 02/24/21 for a hearing aid check.  Her hearing was assessed on 9/15/2020 and results revealed normal sloping to mild rising to normal hearing in the right ear and normal hearing in the left ear. Medical clearance was provided by Dr. Campbell. Hannah had a Neurodiagnostic Auditory Brainstem Response (ABR) on 11/04/2020 and results were normal and did not indicate a possible retrocochlear lesion. She was fit with a right Phonak  STALIN hearing aid on 1/5/2021.    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     Today, Hannah reports that she is not finding much benefit from the right hearing aid. She reports that the hearing aid is helpful while she is working in the infusion unit, yet it is not helpful in reducing noise when she works in the endoscopy unit. She reports that the battery is lasting about 1 week and it always seems to die during the middle of the workday. She reports that she is frequently streaming and enjoys that feature.     OBJECTIVE: The patient would like to return the right hearing aid and exchange it for a rechargeable model. [employer] was contacted and the warranty on the current hearing aid is valid through 3/17/2021. A newer, rechargeable model will be ordered with entry level technology. The sandalwood color is not an option for the Paradise model; therefore, a similar color (sand beige) was ordered.     Hearing Aid Ordered  : [employer]  Model:   Audeo P50-R  Style:   STALIN Deluna:  Small Cap  : 0xM    ASSESSMENT: The right Phonak Audeo  was retrieved from the patient and was returned to [employer] (tracking #[id number]). A right Phonak Audeo P50-R was ordered. No charge for today's appointment.    PLAN: Hannah is scheduled to return on 3/11/21 for a right hearing aid fitting. At that appointment, we will discuss cerarielields and the 45-day  trial period will begin. Please call our clinic at [phone] with questions or concerns.    Carlie Haile, CCC-A  Clinical Audiologist   [place] #[id number]

## 2021-06-16 PROBLEM — L90.0 LICHEN SCLEROSUS: Status: ACTIVE | Noted: 2018-04-24

## 2021-06-16 PROBLEM — J45.909 ASTHMA: Status: ACTIVE | Noted: 2018-04-24

## 2021-06-16 PROBLEM — N60.19 FIBROCYSTIC BREAST CHANGES: Status: ACTIVE | Noted: 2018-04-24

## 2021-06-16 NOTE — PROGRESS NOTES
Chief Complaint   Patient presents with     Ear Pain     poss Rt. ear infection since Thursday         HPI    Patient is here for 3 days of moderate right ear pain with fullness sensation. She also has nasal congestion and a mild cough. No fever, sore throat, hearing changes, ear discharge.     ROS: Pertinent ROS noted in HPI.     Allergies   Allergen Reactions     Penicillins Hives       There is no problem list on file for this patient.      No family history on file.    Social History     Social History     Marital status: Single     Spouse name: N/A     Number of children: N/A     Years of education: N/A     Occupational History     Not on file.     Social History Main Topics     Smoking status: Never Smoker     Smokeless tobacco: Never Used     Alcohol use Not on file     Drug use: Not on file     Sexual activity: Not on file     Other Topics Concern     Not on file     Social History Narrative     No narrative on file         Objective:    Vitals:    02/11/18 0824   BP: 110/70   Pulse: 87   Resp: 18   Temp: 97.5  F (36.4  C)   SpO2: 97%       Gen: NAD  Oropharynx: clear without edema, erythema, nor lesions  Ears: R TM erythematous with small effusion. L TM normal. Ear canals normal without cerumen.   Nose:no discharge   Neck:NAD  CV: RRR, normal S1S2, no M, R, G  Pulm: CTAB, normal effort        Right otitis media with effusion  -     cefdinir (OMNICEF) 300 MG capsule; Take 1 capsule (300 mg total) by mouth 2 (two) times a day for 10 days.  -     fluticasone (FLONASE) 50 mcg/actuation nasal spray; 2 sprays into each nostril daily.

## 2021-06-16 NOTE — PATIENT INSTRUCTIONS - HE
Dear Hannah Reyna,    Your symptoms show that you may have coronavirus (COVID-19). This illness can cause fever, cough and trouble breathing. Many people get a mild case and get better on their own. Some people can get very sick.    Will I be tested for COVID-19?  We would like to test you for Covid-19 virus. I have placed orders for this test.     To schedule: go to your [employer] home page and scroll down to the section that says  You have an appointment that needs to be scheduled  and click the large green button that says  Schedule Now  and follow the steps to find the next available openings.    If you are unable to complete these [employer] scheduling steps, please call [phone] to schedule your testing.     Return to work/school/ guidance:  Please let your workplace manager and staffing office know when your quarantine ends     We can t give you an exact date as it depends on the above. You can calculate this on your own or work with your manager/staffing office to calculate this. (For example if you were exposed on 10/4, you would have to quarantine for 14 full days. That would be through 10/18. You could return on 10/19.)      If you receive a positive COVID-19 test result, follow the guidance of the those who are giving you the results. Usually the return to work is 10 (or in some cases 20 days from symptom onset.) If you work at [facility], you must also be cleared by Employee Occupational Health and Safety to return to work.        If you receive a negative COVID-19 test result and did not have a high risk exposure to someone with a known positive COVID-19 test, you can return to work once you're free of fever for 24 hours without fever-reducing medication and your symptoms are improving or resolved.      If you receive a negative COVID-19 test and If you had a high risk exposure to someone who has tested positive for COVID-19 then you can return to work 14 days after your last contact  with the positive individual    Note: If you have ongoing exposure to the covid positive person, this quarantine period may be more than 14 days. (For example, if you are continued to be exposed to your child who tested positive and cannot isolate from them, then the quarantine of 7-14 days can't start until your child is no longer contagious. This is typically 10 days from onset of the child's symptoms. So the total duration may be 17-24 days in this case.)    Sign up for [employer].   We know it's scary to hear that you might have COVID-19. We want to track your symptoms to make sure you're okay over the next 2 weeks. Please look for an email from [employer]--this is a free, online program that we'll use to keep in touch. To sign up, follow the link in the email you will receive. Learn more at [email or web address]    How can I take care of myself?    Get lots of rest. Drink extra fluids (unless a doctor has told you not to)    Take Tylenol (acetaminophen) or ibuprofen for fever or pain. If you have liver or kidney problems, ask your family doctor if it's okay to take Tylenol o ibuprofen    If you have other health problems (like cancer, heart failure, an organ transplant or severe kidney disease): Call your specialty clinic if you don't feel better in the next 2 days.    Know when to call 911. Emergency warning signs include:  o Trouble breathing or shortness of breath  o Pain or pressure in the chest that doesn't go away  o Feeling confused like you haven't felt before, or not being able to wake up  o Bluish-colored lips or face    Where can I get more information?   [facility] - About COVID-19:   [email or web address]    CDC - What to Do If You're Sick:   www.cdc.gov/coronavirus/2019-ncov/about/steps-when-sick.html        April 13, 2021  RE:  Hannah Reyna                                                                                                                  [address]  [facility] [address]      To whom it may concern:    I evaluated Hannah Reyna on 04/13/21. Hannah Reyna should be excused from work/school.     They should let their workplace manager and staffing office know when their quarantine ends.    We can not give an exact date as it depends on the information below. They can calculate this on their own or work with their manager/staffing office to calculate this. (For example if they were exposed on 10/04, they would have to quarantine for 14 full days. That would be through 10/18. They could return on 10/19.)    Quarantine Guidelines:      If patient receives a positive COVID-19 test result, they should follow the guidance of those who are giving the results. Usually the return to work is 10 (or in some cases 20 days from symptom onset.) If they work at [employer], they must be cleared by Employee Occupational Health and Safety to return to work.        If patient receives a negative COVID-19 test result and did not have a high risk exposure to someone with a known positive COVID-19 test, they can return to work once they're free of fever for 24 hours without fever-reducing medication and their symptoms are improving or resolved.      If patient receives a negative COVID-19 test and if they had a high risk exposure to someone who has tested positive for COVID-19 then they can return to work 14 days after their last contact with the positive individual    Note: If there is ongoing exposure to the covid positive person, this quarantine period may be longer than 14 days. (For example, if they are continually exposed to their child, who tested positive and cannot isolate from them, then the quarantine of 7-14 days can't start until their child is no longer contagious. This is typically 10 days from onset to the child's symptoms. So the total duration may be 17-24 days in this case.)    Sincerely,  Jose Eduardo Marcial PA-C

## 2021-06-16 NOTE — PROGRESS NOTES
AUDIOLOGY REPORT    SUBJECTIVE: Hannah Reyna, 55 y.o. year old female , was seen on 04/01/21 for a first follow up visit regarding the fitting of new hearing aids. Her hearing was assessed on 9/15/2020 and results revealed normal sloping to mild rising to normal hearing in the right ear and normal hearing in the left ear. She had a neurodiagnostic Auditory Brainstem Response (ABR) on 11/04/2020 and results were normal and did not indicate a possible retrocochlear lesion. She was fit with a right Phonak M50-R -in-the-ear on 2/24/21 (she previous tried the Phonak ).     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 At the last appointment a comfort program was added for the endoscopy unit.    Today, Hannah reports that overall things have been going well. She has been wearing the hearing aid while at work and really like the comfort program. She reports benefit from the hearing aid and would like the keep it.      OBJECTIVE:   Hannah reports things are good and declined any programming changes. She does report that the other day when getting ready for bed she tried to watch a video on her phone and it connected to her hearing aid that was in the . We discussed that it will connect first to the hearing aid if it is on and in bluetooth range and showed her how to change this setting in the blue tooth menu on her phone if she wants.     ASSESSMENT: Hearing aid check completed. No charge visit as it is the first follow up appointment.     PLAN: It is recommended that Hannah return every 9-12 months for a hearing aid clean and check, or sooner should concerns arise. Please call our clinic at [phone] with questions or concerns.    Carlie Baldwin,  CCC-A  Clinical Audiologist  [place] #[id number]

## 2021-06-17 NOTE — PROGRESS NOTES
Assessment/Plan:     1. Routine health maintenance  Healthy female exam    2. Screening for diabetes mellitus  - Basic Metabolic Panel    3. Screening for lipid disorders  - Lipid [place] FASTING    4. Screening for malignant neoplasm of cervix  - Gynecologic Cytology (PAP Smear)    5. Hot flashes, menopausal  Discussed treatment of menopausal hot flashes including hormone therapy or Effexor.  Patient does not wish to start any medication at this time, as symptoms are not severe.  She will certainly let me know if she would like to try something.    6. Mild persistent asthma without complication  Well controlled.  ACT score 25.  Continue Symbicort once daily.  - budesonide-formoterol (SYMBICORT) 80-4.5 mcg/actuation inhaler; Inhale 2 puffs daily.  Dispense: 3 Inhaler; Refill: 3    Subjective:   Hannah Reyna  is a 52 y.o. female who presents for an annual exam.  Patient moved to [place] about 1 year ago from [facility].  Her daughter is finishing her first year of college at the [employer].  Patient works as an RN at [employer].  Patient had a colonoscopy yesterday, and this was normal.  She is having the records sent here.  Last Pap was 2016 and was normal.  Patient would like a repeat Pap today.  She is scheduled for a mammogram later today.    Patient complains of intermittent hot flashes.  She has not had any menstrual periods since 2016.  The hot flashes seem to go away, but started again this fall when she moved to [place].  She has been both day and night, but they are more troublesome at night.  She denies any vaginal dryness or irritation.    Asthma: Patient currently using Symbicort once daily.  She is not using any albuterol.  Denies any chronic cough, shortness of breath, chest pain, or wheezing.  Asthma is usually triggered by stress and cold weather.    History of pituitary microadenoma.  Patient had an MRI in 2016, which was unchanged from the previous.    Healthy Habits:    Regular Exercise: ocasionally  Sunscreen Use: yes  Healthy Diet: yes  Dental Visits Regularly: yes  Seat Belt: yes  Sexually active: no  Self Breast Exam Monthly: yes  Hemoccults: na  Flex Sig: na  Colonoscopy: yesterday - normal  Lipid Profile: na  Glucose Screen: 2016  Prevention of Osteoporosis: exercise  Last Dexa: na    Immunization History   Administered Date(s) Administered     Hep B, Adult 07/28/2010, 08/26/2010, 01/26/2011     Influenza, Seasonal, Inj PF IIV3 10/09/2009     Influenza, seasonal,quad inj 6-35 mos 10/15/2014     Influenza,Intradermal,Quad,PF 10/13/2015, 09/27/2016     Influenza,seasonal, Inj IIV3 10/27/1998     Influenza,seasonal,intradermal PF IIV3 10/09/2013     Td, Adult, Absorbed 03/28/2000     Tdap 07/12/2010       Immunization status: up to date and documented.    Gynecologic History  LMP: no  Contraception: no  Last Pap: 3/2016 Results were: normal  Last mammogram: today Results were:     OB History     No data available          Current Outpatient Prescriptions   Medication Sig Dispense Refill     budesonide-formoterol (SYMBICORT) 80-4.5 mcg/actuation inhaler Inhale 2 puffs daily. 3 Inhaler 3     cetirizine (ZYRTEC) 10 MG tablet Take 10 mg by mouth daily.       cholecalciferol, vitamin D3, (VITAMIN D3) 2,000 unit Tab Take by mouth.       multivitamin with minerals (THERA-M) 9 mg iron-400 mcg Tab tablet Take 1 tablet by mouth daily.       No current facility-administered medications for this visit.        Past Medical History:   Diagnosis Date     Pituitary adenoma      Past Surgical History:   Procedure Laterality Date     BREAST SURGERY Right     breast lumpectomy - benign     TONSILLECTOMY          Allergies   Allergen Reactions     Penicillins Hives     Family History   Problem Relation Age of Onset     Lung cancer Mother      Lymphoma Father      Alcohol abuse Brother      No Medical Problems Brother      Social History     Social History     Marital status: Single     Spouse  "name: N/A     Number of children: N/A     Years of education: N/A     Occupational History     Not on file.     Social History Main Topics     Smoking status: Never Smoker     Smokeless tobacco: Never Used     Alcohol use No     Drug use: No     Sexual activity: No     Other Topics Concern     Not on file     Social History Narrative        Review of Systems  Fourteen point review of systems negative except as mentioned in HPI    Objective:      Vitals:    04/24/18 0801   BP: 102/66   Patient Site: Right Arm   Patient Position: Sitting   Cuff Size: Adult Regular   Pulse: 68   Weight: 116 lb 6.4 oz (52.8 kg)   Height: 4' 11.5\" (1.511 m)     Body mass index is 23.12 kg/(m^2).    Physical Exam:  General Appearance: Alert, cooperative, no distress, appears stated age  Head: Normocephalic, without obvious abnormality, atraumatic  Eyes: PERRL, conjunctiva/corneas clear, EOM's intact.  Wearing corrective glasses.  Ears: Normal TM's and external ear canals, both ears  Throat: Lips, mucosa, and tongue normal; teeth and gums normal  Neck: Supple, symmetrical, trachea midline, no adenopathy;  thyroid: not enlarged, symmetric, no tenderness/mass/nodules; no carotid bruit or JVD  Back: Symmetric, no curvature, ROM normal, no CVA tenderness  Lungs: Clear to auscultation bilaterally, respirations unlabored  Breasts: No breast masses, tenderness, asymmetry, or nipple discharge.  Heart: Regular rate and rhythm, S1 and S2 normal, no murmur, rub, or gallop  Abdomen: Soft, non-tender, bowel sounds active all four quadrants,  no masses, no organomegaly  Pelvic:Normally developed genitalia with no external lesions or eruptions. Vagina and cervix show no lesions, inflammation, discharge or tenderness. No cystocele, No rectocele. No adnexal mass or tenderness.  Extremities: Extremities normal, atraumatic, no cyanosis or edema  Skin: Skin color, texture, turgor normal, no rashes or lesions  Lymph nodes: Cervical, supraclavicular, and " axillary nodes normal  Neurologic: Normal   Psychologic: appropriate affective, answers all of my questions appropriately. No hallucinations, delusion, or suicidal ideations.    DANA McdowellC

## 2021-06-17 NOTE — PATIENT INSTRUCTIONS - HE
Patient Instructions by Anita Miranda NP at 5/9/2019  9:20 AM     Author: Anita Miranda NP Service: -- Author Type: Nurse Practitioner    Filed: 5/9/2019  9:22 AM Encounter Date: 5/9/2019 Status: Signed    : Anita Miranda NP (Nurse Practitioner)       Patient Education     Prevention Guidelines, Women Ages 50 to 64  Screening tests and vaccines are an important part of managing your health. A screening test is done to find possible disorders or diseases in people who don't have any symptoms. The goal is to find a disease early so lifestyle changes can be made and you can be watched more closely to reduce the risk of disease, or to detect it early enough to treat it most effectively. Screening tests are not considered diagnostic, but are used to determine if more testing is needed. Health counseling is essential, too. Below are guidelines for these, for women ages 50 to 64. Talk with your healthcare provider to make sure youre up to date on what you need.  Screening Who needs it How often   Type 2 diabetes or prediabetes All women beginning at age 45 and women without symptoms at any age who are overweight or obese and have 1 or more additional risk factors for diabetes. At  least every 3 years   Type 2 diabetes or prediabetes All women diagnosed with gestational diabetes Lifelong testing every 3 years   Type 2 diabetes All women with prediabetes Every year   Alcohol misuse All women in this age group At routine exams   Blood pressure All women in this age group Yearly checkup if your blood pressure is normal  Normal blood pressure is less than 120/80 mm Hg  If your blood pressure reading is higher than normal, follow the advice of your healthcare provider   Breast cancer All women at average risk in this age group Yearly mammogram should be done until age 54. At age 55, switch to mammograms every other year or choose to continue yearly mammograms.  All women should be familiar with the potential  benefits and risks of breast cancer screening with mammograms.      Cervical cancer All women in this age group, except women who have had a complete hysterectomy Pap test every 3 years or Pap test with human papillomavirus (HPV) test every 5 years   Chlamydia Women at increased risk for infection At routine exams   Colorectal cancer All women in this age group Flexible sigmoidoscopy every 5 years, or colonoscopy every 10 years, or double-contrast barium enema every 5 years; yearly fecal occult blood test or fecal immunochemical test; or a stool DNA test as often as your health care provider advises; talk with your health care provider about which tests are best for you   Depression All women in this age group At routine exams   Gonorrhea Sexually active women at increased risk for infection At routine exams   Hepatitis C Anyone at increased risk; 1 time for those born between 1945 and 1965 At routine exams   High cholesterol or triglycerides All women in this age group who are at risk for coronary artery disease At least every 5 years   HIV All women At routine exams   Lung cancer Adults age 55 to 80 who have smoked Yearly screening in smokers with 30 pack-year history of smoking or who quit within 15 years   Obesity All women in this age group At routine exams   Osteoporosis Women who are postmenopausal Ask your healthcare provider   Syphilis Women at increased risk for infection - talk with your healthcare provider At routine exams   Tuberculosis Women at increased risk for infection - talk with your healthcare provider Ask your healthcare provider   Vision All women in this age group Ask your healthcare provider   Vaccine Who needs it How often   Chickenpox (varicella) All women in this age group who have no record of this infection or vaccine 2 doses; the second dose should be given at least 4 weeks after the first dose   Hepatitis A Women at increased risk for infection - talk with your healthcare provider 2  doses given at least 6 months apart   Hepatitis B Women at increased risk for infection - talk with your healthcare provider 3 doses over 6 months; second dose should be given 1 month after the first dose; the third dose should be given at least 2 months after the second dose and at least 4 months after the first dose   Haemophilus influenzaeType B (HIB) Women at increased risk for infection - talk with your healthcare provider 1 to 3 doses   Influenza (flu) All women in this age group Once a year   Measles, mumps, rubella (MMR) Women in this age group through their late 50s who have no record of these infections or vaccines 1 dose   Meningococcal Women at increased risk for infection - talk with your healthcare provider 1 or more doses   Pneumococcal conjugate vaccine (PCV13) and pneumococcal polysaccharide vaccine (PPSV23) Women at increased risk for infection - talk with your healthcare provider PCV13: 1 dose ages 19 to 65 (protects against 13 types of pneumococcal bacteria)  PPSV23: 1 to 2 doses through age 64, or 1 dose at 65 or older (protects against 23 types of pneumococcal bacteria)   Tetanus/diphtheria/pertussis (Td/Tdap) booster All women in this age group Td every 10 years, or a one-time dose of Tdap instead of a Td booster after age 18, then Td every 10 years   Zoster All women ages 60 and older 1 dose   Counseling Who needs it How often   BRCA gene mutation testing for breast and ovarian cancer susceptibility Women with increased risk for having gene mutation When your risk is known   Breast cancer and chemoprevention Women at high risk for breast cancer When your risk is known   Diet and exercise Women who are overweight or obese When diagnosed, and then at routine exams   Sexually transmitted infection prevention Women at increased risk for infection - talk with your healthcare provider At routine exams   Use of daily aspirin Women ages 55 and up in this age group who are at risk for cardiovascular  health problems such as stroke When your risk is known   Use of tobacco and the health effects it can cause All women in this age group Every exam   1American Cancer Society  Date Last Reviewed: 1/26/2016 2000-2017 [employer]. [address] [place] [address], [place] [address]. All rights reserved. This information is not intended as a substitute for professional medical care. Always follow your healthcare professional's instructions.

## 2021-06-18 NOTE — PATIENT INSTRUCTIONS - HE
Patient Instructions by Anita Miranda NP at 8/6/2020  7:40 AM     Author: Anita Miranda NP Service: -- Author Type: Nurse Practitioner    Filed: 8/6/2020  7:34 AM Encounter Date: 8/6/2020 Status: Signed    : Anita Miranda NP (Nurse Practitioner)       Patient Education     Prevention Guidelines, Women Ages 50 to 64  Screening tests and vaccines are an important part of managing your health. A screening test is done to find possible disorders or diseases in people who don't have any symptoms. The goal is to find a disease early so lifestyle changes can be made and you can be watched more closely to reduce the risk of disease, or to detect it early enough to treat it most effectively. Screening tests are not considered diagnostic, but are used to determine if more testing is needed. Health counseling is essential, too. Below are guidelines for these, for women ages 50 to 64. Talk with your healthcare provider to make sure youre up to date on what you need.  Screening Who needs it How often   Type 2 diabetes or prediabetes All women beginning at age 45 and women without symptoms at any age who are overweight or obese and have 1 or more additional risk factors for diabetes. At  least every 3 years   Type 2 diabetes or prediabetes All women diagnosed with gestational diabetes Lifelong testing every 3 years   Type 2 diabetes All women with prediabetes Every year   Alcohol misuse All women in this age group At routine exams   Blood pressure All women in this age group Yearly checkup if your blood pressure is normal  Normal blood pressure is less than 120/80 mm Hg  If your blood pressure reading is higher than normal, follow the advice of your healthcare provider   Breast cancer All women at average risk in this age group Yearly mammogram should be done until age 54. At age 55, you can switch to every other year or choose to continue yearly.  All women should know the possible benefits and risks of breast  cancer screening with mammograms.   Cervical cancer All women in this age group, except women who have had a complete hysterectomy Pap test every 3 years or Pap test with human papillomavirus (HPV) test every 5 years   Chlamydia Women at increased risk for infection At routine exams   Colorectal cancer All women at average risk in this age group Multiple tests are available and are used at different times. Possible tests include:    Flexible sigmoidoscopy every 5 years, or    Colonoscopy every 10 years, or    CT colonography (virtual colonoscopy) every 5 years, or    Yearly fecal occult blood test, or    Yearly fecal immunochemical test every year, or    Stool DNA test, every 3 years  If you choose a test other than a colonoscopy and have an abnormal test result, you will need to follow up with a colonoscopy. Screening advice varies among expert groups. Talk with your healthcare provider about which tests are best for you.  Some people should be screened using a different schedule because of their personal or family health history. Talk with your healthcare provider about your health history.   Depression All women in this age group At routine exams   Gonorrhea Sexually active women at increased risk for infection At routine exams   Hepatitis C Anyone at increased risk; 1 time for those born between 1945 and 1965 At routine exams   High cholesterol or triglycerides All women in this age group who are at risk for coronary artery disease At least every 5 years   HIV All women At routine exams   Lung cancer Adults age 55 to 80 who have smoked Yearly screening in smokers with 30 pack-year history of smoking or who quit within 15 years   Obesity All women in this age group At routine exams   Osteoporosis Women who are postmenopausal Ask your healthcare provider   Syphilis Women at increased risk for infection - talk with your healthcare provider At routine exams   Tuberculosis Women at increased risk for infection - talk  with your healthcare provider Ask your healthcare provider   Vision All women in this age group Ask your healthcare provider   Vaccine Who needs it How often   Chickenpox (varicella) All women in this age group who have no record of this infection or vaccine 2 doses; the second dose should be given at least 4 weeks after the first dose   Hepatitis A Women at increased risk for infection - talk with your healthcare provider 2 doses given at least 6 months apart   Hepatitis B Women at increased risk for infection - talk with your healthcare provider 3 doses over 6 months; second dose should be given 1 month after the first dose; the third dose should be given at least 2 months after the second dose and at least 4 months after the first dose   Haemophilus influenzae Type B (HIB) Women at increased risk for infection - talk with your healthcare provider 1 to 3 doses   Influenza (flu) All women in this age group Once a year   Measles, mumps, rubella (MMR) Women in this age group through their late 50s who have no record of these infections or vaccines 1 dose   Meningococcal Women at increased risk for infection - talk with your healthcare provider 1 or more doses   Pneumococcal conjugate vaccine (PCV13) and pneumococcal polysaccharide vaccine (PPSV23) Women at increased risk for infection - talk with your healthcare provider PCV13: 1 dose ages 19 to 65 (protects against 13 types of pneumococcal bacteria)  PPSV23: 1 to 2 doses through age 64, or 1 dose at 65 or older (protects against 23 types of pneumococcal bacteria)   Tetanus/diphtheria/pertussis (Td/Tdap) booster All women in this age group Td every 10 years, or a 1-time dose of Tdap instead of a Td booster after age 18, then Td every 10 years   Zoster All women ages 60 and older 1 dose   Counseling Who needs it How often   BRCA gene mutation testing for breast and ovarian cancer susceptibility Women with increased risk for having gene mutation When your risk is known    Breast cancer and chemoprevention Women at high risk for breast cancer When your risk is known   Diet and exercise Women who are overweight or obese When diagnosed, and then at routine exams   Sexually transmitted infection prevention Women at increased risk for infection - talk with your healthcare provider At routine exams   Use of daily aspirin Women ages 55 and up in this age group who are at risk for cardiovascular health problems such as stroke When your risk is known   Use of tobacco and the health effects it can cause All women in this age group Every exam   1 American Cancer Society  Date Last Reviewed: 1/26/2016 2000-2019 [employer]. [address] [place] [address]. All rights reserved. This information is not intended as a substitute for professional medical care. Always follow your healthcare professional's instructions.

## 2021-06-19 NOTE — LETTER
Letter by Anita Miranda NP at      Author: Anita Miranda NP Service: -- Author Type: --    Filed:  Encounter Date: 5/9/2019 Status: (Other)       Asthma Action Plan    Patient Name: Hannah Reyna  Patient YOB: 1965    Doctor's Name: Anita Ale Ruth    Emergency Contact:              Severity Classification: Persistent    What triggers my asthma: exercise, animals, mold and pollen    GREEN ZONE: Doing WellQuick Reli   No cough, wheeze, chest tightness or shortness of breath during the day or night  Can do your usual activities    Take these long-term-control medicines every day  Medicine How Much to Take When to take it   Symbicort 2 puffs Daily     Take these medicines before exercise if your asthma is exercise-induced:  Medicine How Much to Take When to take it   albuterol  (also known as ProAir, Ventolin and Proventil) 2 puffs 15-30 minutes prior to exercise or sports     YELLOW ZONE: Asthma is Getting Worse   Cough, wheeze, chest tightness or shortness of breath or  Waking at night due to asthma, or  Can do some, but not all, usual activities.    Keep taking green zone medications and add quick-relief medicine:  Quick Relief Medicine How Much to Take When to take it   Albuterol  Reuq  (also known as ProAir, Ventolin and Proventil) 2 puffs every 4 hours as needed     If you do not feel better and your symptoms do not return to the green zone after one hour of the quick relief medication, then:    Take quick relief treatment again. Call your clinician within 1 hour.    Contact your clinician if you are using quick relief medication more than 2 times per week.    RED ZONE: Medical Alert!   Very short of breath, or  Quick relief medications have not helped, or  Cannot do usual activities, or  Symptoms are same or worse after 24 hours in the Yellow Zone.    Continue green zone medicines and add:  Quick Relief Medicine Dose When to take it   albuterol  (also known as ProAir, Ventolin and  Proventil) 2 puffs may repeat every 20 minutes for up to 1 hour       IF ANY OF THESE ARE HAPPENING, SEEK EMERGENCY HELP AND CALL 911!   You are struggling to breathe and are uncomfortable or  There is simply no clear improvement and you are worried about how to get through the next 30 minutes or  Trouble walking and talking due to shortness of breath, or  Lips or fingernails are blue    Provider signature:  Electronically Signed by Anita Miranda   Date: 05/09/19

## 2021-06-20 NOTE — LETTER
Letter by Sanket Voss MD at      Author: Sanket Voss MD Service: -- Author Type: --    Filed:  Encounter Date: 8/18/2020 Status: (Other)         Hannah Reyna  [address]  [facility] [address]             August 18, 2020         Dear Ms. Reyna,    Below are the results from your recent visit: Normal Pap test and negative for HPV.  Cholesterol is very well controlled.  Sugar is good at 79, no diabetes and normal kidney test.    Resulted Orders   Lipid [place] FASTING   Result Value Ref Range    Cholesterol 165 <=199 mg/dL    Triglycerides 66 <=149 mg/dL    HDL Cholesterol 62 >=50 mg/dL    LDL Calculated 90 <=129 mg/dL    Patient Fasting > 8hrs? Yes    Basic Metabolic Panel   Result Value Ref Range    Sodium 141 136 - 145 mmol/L    Potassium 3.9 3.5 - 5.0 mmol/L    Chloride 103 98 - 107 mmol/L    CO2 30 22 - 31 mmol/L    Anion Gap, Calculation 8 5 - 18 mmol/L    Glucose 79 70 - 125 mg/dL    Calcium 9.5 8.5 - 10.5 mg/dL    BUN 11 8 - 22 mg/dL    Creatinine 0.70 0.60 - 1.10 mg/dL    GFR MDRD Af Amer >60 >60 mL/min/1.73m2    GFR MDRD Non Af Amer >60 >60 mL/min/1.73m2    Narrative    Fasting Glucose reference range is 70-99 mg/dL per  American Diabetes Association (ADA) guidelines.   Gynecologic Cytology (PAP Smear)   Result Value Ref Range    Case Report       Gynecologic Cytology Report                       Case: [id number]                                   Authorizing Provider:  Anita Miranda NP       Collected:           08/06/2020 0814              Ordering Location:     [facility]  Received:            08/06/2020 0814                                     Family Medicine/OB                                                           First Screen:          Ana Salguero CT                                                                               (ASCP)                                                                       Specimen:    SUREPATH PAP, SCREENING,  Endocervical/cervical                                             Interpretation  Negative for squamous intraepithelial lesion or malignancy.      Negative for squamous intraepithelial lesion or malignancy    Result Flag Normal Normal    Specimen Adequacy       Satisfactory for evaluation, endocerv/transformation zone component absent, atrophy    HPV Reflex? Yes regardless of result     HIGH RISK No     LMP/Menopause Date postmenopause     Abnormal Bleeding No     Pt Status postmenopause     Birth Control/Hormones None     Previous Normal/Date na     Prev Abn Date/Dx hx of endometrial biopsy     Cervical Appearance WNL    HPV High Risk DNA Cervical   Result Value Ref Range    HPV Source SurePath     HPV16 DNA Negative NEG    HPV18 DNA Negative NEG    Other HR HPV Negative NEG    Final Diagnosis SEE NOTES       Comment:      This patient's sample is negative for HPV DNA.  This test was developed and its performance characteristics determined by the  [facility]. It  has not been cleared or approved by the FDA. The laboratory is regulated under  CLIA as qualified to perform high-complexity testing. This test is used for  clinical purposes. It should not be regarded as investigational or for  research.  (Note)  METHODOLOGY:  The Roche nadeem 4800 system uses automated extraction,  simultaneous amplification of HPV (L1 region) and beta-globin,  followed by  real time detection of fluorescent labeled HPV and beta  globin using specific oligonucleotide probes . The test specifically  identifies types HPV 16 DNA and HPV 18 DNA while concurrently  detecting the rest of the high risk types (31, 33, 35, 39, 45, 51,  52, 56, 58, 59, 66 or 68).    COMMENTS:  This test is not intended for use as a screening device  for women under age 30 with normal cervical   cytology.  Results should  be correlated with cytologic and histologic findings. Close clinical  followup is  recommended.        Specimen Description Cervical Cells       Comment:        Performed and/or entered by:  [facility] [address] [address]             Please call with questions or contact us using MyChart.    Sincerely,        Electronically signed by Sanket Voss MD

## 2021-06-21 NOTE — LETTER
Letter by Lali Martel AuD at      Author: Lali Martel AuD Service: -- Author Type: --    Filed:  Encounter Date: 10/30/2020 Status: (Other)       [facility]- Audiology Benefit Letter    GIANCARLO BUITRAGO  1965  [address]  [facility] [address]    Insurance Company: Payor: MEDICA / Plan: MEDICA / Product Type: PPO/POS/FFS /      [place] Product: No    ID # :  [id number]    Group#:  [id number]    Estimate of Benefits  Consult Visit Benefits:  MEDICA 11/2020 SULEMA     HAE, HAF, HAC Benefits:   NOT COVERED, ONLY 18 AND YOUNGER COVERED     Batteries/Accessories Coverage:  NOT COVERED    Representative name: Ifeoma Solo reference: [id number]  Date of contact: 10/30/2020    Insurance verified today by BRIDGETT CANTU    Additional Information:      The information provided is an estimate of benefits. This does not guarantee coverage; the insurance company will make the final determination of coverage to include patient responsibility of payment by the patient.   [facility] is not responsible for the decisions made by the insurance company regarding coverage.  Any changes to coverage (new plan or new policy) or procedures may void the contents provided in this letter.     Term Definitions  Patient responsibility: Can include but not limited to: co-pays, co-insurance, deductibles, out-of-pocket and non-covered and/or policy exclusions.

## 2021-06-22 NOTE — PROGRESS NOTES
CC: The patient is being seen as a consult from Anita Miranda NP, secondary to post menopausal bleeding and an ovarian cyst.    HPI: The pt is a 53 y.o. SWF  who presents with bleeding that started Dec 6.  It was in the evening and was just spotting.  The next morning the bleeding was heavy, making her change an overnight pad every 2-3 hours.  It stayed that heavy for 3 days.  On Dec 10 she went back to spotting for a week; the bleeding is gone now.  She had an ultrasound 12/7/18 that showed a 6 mm endometrium and a 1.6 x 1.3 x 1 cm complex left ovarian cyst.  She has been menopausal for about 3 years.  She didn't have any symptoms like a period was coming.  She has not had bleeding like this before.  Her hot flashes ended this past spring.    Past Medical History:   Diagnosis Date     Pituitary adenoma (H)        Past Surgical History:   Procedure Laterality Date     BREAST SURGERY Right     breast lumpectomy - benign     TONSILLECTOMY         Patient's   Family History   Problem Relation Age of Onset     Lung cancer Mother      Lymphoma Father      Alcohol abuse Brother      No Medical Problems Brother        Patient   Social History     Socioeconomic History     Marital status: Single     Spouse name: None     Number of children: None     Years of education: None     Highest education level: None   Social Needs     Financial resource strain: None     Food insecurity - worry: None     Food insecurity - inability: None     Transportation needs - medical: None     Transportation needs - non-medical: None   Occupational History     None   Tobacco Use     Smoking status: Never Smoker     Smokeless tobacco: Never Used   Substance and Sexual Activity     Alcohol use: No     Drug use: No     Sexual activity: No     Birth control/protection: Post-menopausal   Other Topics Concern     None   Social History Narrative     None       Outpatient Medications Prior to Visit   Medication Sig Dispense Refill      "budesonide-formoterol (SYMBICORT) 80-4.5 mcg/actuation inhaler Inhale 2 puffs daily. 3 Inhaler 3     cetirizine (ZYRTEC) 10 MG tablet Take 10 mg by mouth daily.       cholecalciferol, vitamin D3, (VITAMIN D3) 2,000 unit Tab Take by mouth.       multivitamin with minerals (THERA-M) 9 mg iron-400 mcg Tab tablet Take 1 tablet by mouth daily.       No facility-administered medications prior to visit.        Patient is allergic to penicillins.    ROS:  12 part ROS is negative aside from those symptoms in the HPI    PE:  /60   Pulse 60   Ht 4' 11.5\" (1.511 m)   Wt 123 lb (55.8 kg)   LMP 12/06/2018           Body mass index is 24.43 kg/m .    General: WN/WD WF, NAD  EG/BUS wnl  Vagina no lesions, no abnormal discharge  Cervix no lesions, tenaculum was used, os finder was used  Uterus sounds to 7 cm, 2 passes with scant to moderate return  Pt tolerated procedure well  Psych: normal mood  Neuro: CN I-XII grossly intact  MS: normal gait    Assessment: 53 y.o. SWF  with post menopausal bleeding and a left ovarian cyst.    Plan: Natural history of causes for PMB and the cyst discussed with the patient.  Endometrial biopsy was successful today; she will be notified with the results.  We discussed the possibility of needing medication if hyperplasia is present.  I counseled her that I don't think there is cancer present, but we'll have to wait for the pathology to know for sure.  We discussed that she needs a follow up ultrasound to evaluate the cyst.  She will schedule this for early to mid January.  Questions were answered.    Approximately 15 minutes were spent with the patient with the majority in counseling, separate from the biopsy itself.    "

## 2021-06-22 NOTE — PROGRESS NOTES
Assessment/Plan:     1. Postmenopausal bleeding  Patient with postmenopausal bleeding.  We deferred a complete pelvic exam today, as I will be sending patient for a pelvic ultrasound.  I am reassured by normal Pap exam earlier this year.  Referred for pelvic ultrasound to rule out any structural abnormality or endometrial thickening.  Exam is otherwise unremarkable.  - US Pelvis With Transvaginal Non OB; Future        Subjective:     Hannah Reyna is a 53 y.o. female who presents with complaints of vaginal bleeding.  Patient has been postmenopausal for approximately 3 years.  She continued to have postmenopausal symptoms such as night sweats and hot flashes, but these have mostly resolved in the last 6 months.  Patient developed menstrual bleeding last night, and it has gradually increased in amount.  She does have some very minimal lower abdominal cramping.  There is some small clots patient is otherwise feeling well.  She denies any fever, nausea/vomiting, or diarrhea.  Patient had a normal Pap exam in April of this year.  She does endorse that her previous provider did send her for vaginal ultrasounds on 2 occasions due to an abnormal pelvic exam.  These ultrasounds were normal.  No family history of uterine or ovarian cancer that she is aware of.      The following portions of the patient's history were reviewed and updated as appropriate: allergies, current medications.    Review of Systems  A comprehensive review of systems was performed and was otherwise negative    Objective:     /68   Pulse 82   Wt 125 lb 6.4 oz (56.9 kg)   LMP 09/11/2016   BMI 24.90 kg/m      General Appearance: Alert, cooperative, no distress, appears stated age  Lungs: Clear to auscultation bilaterally, respirations unlabored  Heart: Regular rate and rhythm, S1 and S2 normal, no murmur, rub, or gallop   Abdomen: Soft, non-tender, bowel sounds active all four quadrants,  no masses, no organomegaly      Anita Miranda NP-C

## 2021-06-29 NOTE — PROGRESS NOTES
Progress Notes by Anita Miranda NP at 8/6/2020  7:40 AM     Author: Anita Miranda NP Service: -- Author Type: Nurse Practitioner    Filed: 8/6/2020  8:23 AM Encounter Date: 8/6/2020 Status: Signed    : Anita Miranda NP (Nurse Practitioner)       FEMALE PREVENTATIVE EXAM    Assessment and Plan:       1. Routine health maintenance  Healthy female exam    2. Mild persistent asthma without complication  Well controlled on Symbicort  - budesonide-formoteroL (SYMBICORT) 80-4.5 mcg/actuation inhaler; Inhale 2 puffs daily.  Dispense: 1 Inhaler; Refill: 12    3. Screening for diabetes mellitus  - Basic Metabolic Panel    4. Screening for lipid disorders  - Lipid [place] FASTING    5. Perceived hearing changes  - Ambulatory referral to Audiology    6. Skin exam, screening for cancer  - Ambulatory referral to Dermatology    7. Screening for malignant neoplasm of cervix  - Gynecologic Cytology (PAP Smear)     Next follow up:  Return in about 1 year (around 8/6/2021) for Physical.    Immunization Review  Adult Imm Review: Due today, orders placed    I discussed the following with the patient:   Adult Healthy Living: Importance of regular exercise  Healthy nutrition      Subjective:   Chief Complaint: Hannah Reyna is an 54 y.o. female here for a preventative health visit.     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  Has one adult daughter.    Has some new hearing changes - would like a referral to audiology.    Requesting a referral to dermatology for a skin check.    Patient likes to have pap smears yearly.  She did have an endometrial biopsy about 2 years ago.  She is postmenopausal.  Denies any abnormal bleeding or discharge.  No history of abnormal pap.    On Symbicort for asthma.  No exacerbations this year.  Denies a chronic cough, wheezing, or shortness of breath.     No other concerns today.     Healthy Habits  Are you taking a daily aspirin? No  Do you typically exercising at least 40  "min, 3-4 times per week?  Yes  Do you usually eat at least 4 servings of fruit and vegetables a day, include whole grains and fiber and avoid regularly eating high fat foods? Yes  Have you had an eye exam in the past two years? Yes  Do you see a dentist twice per year? Yes  Do you have any concerns regarding sleep? No    Safety Screen  If you own firearms, are they secured in a locked gun cabinet or with trigger locks? The patient does not own any firearms  Do you feel you are safe where you are living?: Yes (8/6/2020  7:39 AM)  Do you feel you are safe in your relationship(s)?: Yes (8/6/2020  7:39 AM)      Review of Systems:  Please see above.  The rest of the review of systems are negative for all systems.     Pap History:   Yes - updated in Problem List and Health Maintenance accordingly  Cancer Screening       Status Date      MAMMOGRAM Overdue 5/9/2020      Done 5/9/2019 MAMMO SCREENING BILATERAL     Patient has more history with this topic...    PAP SMEAR Next Due 5/9/2024      Done 5/9/2019 GYNECOLOGIC CYTOLOGY (PAP SMEAR)     Patient has more history with this topic...          Patient Care Team:  Anita Miranda NP as PCP - General (Family Medicine)  Anita Miranda NP as Assigned PCP        History     Reviewed By Date/Time Sections Reviewed    Anita Miranda NP 8/6/2020  8:19 AM Medical, Surgical    Anita Miranda NP 8/6/2020  7:51 AM Tobacco    Anita Miranda NP 8/6/2020  7:43 AM Tobacco    Leslye Stovall MA 8/6/2020  7:39 AM Tobacco            Objective:   Vital Signs:   Visit Vitals  /64   Pulse 74   Ht 5' 0.5\" (1.537 m)   Wt 119 lb 11.2 oz (54.3 kg)   LMP 12/06/2018   BMI 22.99 kg/m           PHYSICAL EXAM  General Appearance: Alert, cooperative, no distress, appears stated age  Head: Normocephalic, without obvious abnormality, atraumatic  Eyes: PERRL, conjunctiva/corneas clear, EOM's intact. Corrective glasses in place.  Ears: Normal TM's and external ear canals, both ears  Nose: " Nares normal, septum midline  Throat: Lips, mucosa, and tongue normal; teeth and gums normal  Neck: Supple, symmetrical, trachea midline, no adenopathy;  thyroid: not enlarged, symmetric, no tenderness/mass/nodules  Back:  no CVA tenderness  Lungs: Clear to auscultation bilaterally, respirations unlabored  Breasts: No breast masses, tenderness, asymmetry, or nipple discharge.  Heart: Regular rate and rhythm, S1 and S2 normal, no murmur, rub, or gallop  Abdomen: Soft, non-tender, bowel sounds active all four quadrants,  no masses, no organomegaly  Pelvic: Normally developed genitalia with no external lesions or eruptions. Vagina and cervix show no lesions, inflammation, discharge or tenderness. No cystocele, No rectocele. No adnexal mass or tenderness.  Extremities: Extremities normal, atraumatic, no cyanosis or edema  Skin: Skin color, texture, turgor normal, no rashes or lesions  Lymph nodes: Cervical, supraclavicular, and axillary nodes normal  Neurologic: Normal   Psychologic: appropriate affective, answers all of my questions appropriately. No hallucinations, delusion, or suicidal ideations.    Anita Miranda, NP

## 2021-06-29 NOTE — PROGRESS NOTES
"Progress Notes by Donna Clark AuD at 9/15/2020  9:00 AM     Author: Donna Clark AuD Service: -- Author Type: Audiologist    Filed: 9/15/2020 11:05 AM Encounter Date: 9/15/2020 Status: Signed    : Donna Clark AuD (Audiologist)       Audiology only; referred by Anita Miranda    Summary:  Audiology visit completed. Please see audiogram below or under \"media\" tab for history and results.    Transducer:  Both insert phones and circumaural headphones were used.    Reliability:    Good    Recommendations:  Follow-up with PCP; ENT referral recommended due to asymmetrical hearing loss measured today. Retest hearing annually (to monitor) or per medical management/patient concern.  Wear hearing protection consistently in noise to preserve residual hearing sensitivity.     Carlie Cooper, [facility]  [place] Licensed Audiologist [id number]           "

## 2021-07-03 NOTE — ADDENDUM NOTE
Addendum Note by Yg Fowler CMA at 9/15/2020  9:00 AM     Author: Yg Fowler CMA Service: -- Author Type: Certified Medical Assistant    Filed: 1/19/2021  4:43 PM Encounter Date: 9/15/2020 Status: Signed    : Yg Fowler CMA (Certified Medical Assistant)    Addended by: YG FOWLER on: 1/19/2021 04:43 PM        Modules accepted: Orders

## 2021-07-03 NOTE — ADDENDUM NOTE
Addendum Note by Yg Fowler CMA at 9/15/2020  9:00 AM     Author: Yg Fowler CMA Service: -- Author Type: Certified Medical Assistant    Filed: 9/15/2020  1:49 PM Encounter Date: 9/15/2020 Status: Signed    : Yg Fowler CMA (Certified Medical Assistant)    Addended by: YG FOWLER on: 9/15/2020 01:49 PM        Modules accepted: Orders

## 2021-07-29 ENCOUNTER — IMMUNIZATION (OUTPATIENT)
Dept: NURSING | Facility: CLINIC | Age: 56
End: 2021-07-29
Payer: COMMERCIAL

## 2021-07-29 PROCEDURE — 91300 PR COVID VAC PFIZER DIL RECON 30 MCG/0.3 ML IM: CPT

## 2021-07-29 PROCEDURE — 0001A PR COVID VAC PFIZER DIL RECON 30 MCG/0.3 ML IM: CPT

## 2021-08-17 ENCOUNTER — IMMUNIZATION (OUTPATIENT)
Dept: NURSING | Facility: CLINIC | Age: 56
End: 2021-08-17
Attending: PEDIATRICS
Payer: COMMERCIAL

## 2021-08-17 PROCEDURE — 0002A PR COVID VAC PFIZER DIL RECON 30 MCG/0.3 ML IM: CPT

## 2021-08-17 PROCEDURE — 91300 PR COVID VAC PFIZER DIL RECON 30 MCG/0.3 ML IM: CPT

## 2021-09-28 ENCOUNTER — HOSPITAL ENCOUNTER (OUTPATIENT)
Dept: MAMMOGRAPHY | Facility: CLINIC | Age: 56
Discharge: HOME | End: 2021-09-28
Attending: NURSE PRACTITIONER | Admitting: NURSE PRACTITIONER
Payer: COMMERCIAL

## 2021-09-28 DIAGNOSIS — Z12.31 VISIT FOR SCREENING MAMMOGRAM: ICD-10-CM

## 2021-09-28 PROCEDURE — 77063 BREAST TOMOSYNTHESIS BI: CPT

## 2021-09-30 ENCOUNTER — OFFICE VISIT (OUTPATIENT)
Dept: FAMILY MEDICINE | Facility: CLINIC | Age: 56
End: 2021-09-30
Payer: COMMERCIAL

## 2021-09-30 VITALS
BODY MASS INDEX: 20.95 KG/M2 | WEIGHT: 106.7 LBS | SYSTOLIC BLOOD PRESSURE: 102 MMHG | HEIGHT: 60 IN | HEART RATE: 60 BPM | DIASTOLIC BLOOD PRESSURE: 70 MMHG

## 2021-09-30 DIAGNOSIS — Z13.220 SCREENING FOR LIPID DISORDERS: ICD-10-CM

## 2021-09-30 DIAGNOSIS — Z00.00 ROUTINE GENERAL MEDICAL EXAMINATION AT A HEALTH CARE FACILITY: Primary | ICD-10-CM

## 2021-09-30 DIAGNOSIS — R53.83 FATIGUE, UNSPECIFIED TYPE: ICD-10-CM

## 2021-09-30 DIAGNOSIS — Z12.4 SCREENING FOR MALIGNANT NEOPLASM OF CERVIX: ICD-10-CM

## 2021-09-30 DIAGNOSIS — N32.89 BLADDER SPASMS: ICD-10-CM

## 2021-09-30 DIAGNOSIS — D35.2 PROLACTINOMA (H): ICD-10-CM

## 2021-09-30 DIAGNOSIS — Z13.1 SCREENING FOR DIABETES MELLITUS: ICD-10-CM

## 2021-09-30 DIAGNOSIS — J45.30 MILD PERSISTENT ASTHMA WITHOUT COMPLICATION: ICD-10-CM

## 2021-09-30 LAB
ANION GAP SERPL CALCULATED.3IONS-SCNC: 10 MMOL/L (ref 5–18)
BUN SERPL-MCNC: 10 MG/DL (ref 8–22)
CALCIUM SERPL-MCNC: 9.9 MG/DL (ref 8.5–10.5)
CHLORIDE BLD-SCNC: 105 MMOL/L (ref 98–107)
CHOLEST SERPL-MCNC: 143 MG/DL
CO2 SERPL-SCNC: 26 MMOL/L (ref 22–31)
CREAT SERPL-MCNC: 0.66 MG/DL (ref 0.6–1.1)
ERYTHROCYTE [DISTWIDTH] IN BLOOD BY AUTOMATED COUNT: 11.9 % (ref 10–15)
FASTING STATUS PATIENT QL REPORTED: YES
GFR SERPL CREATININE-BSD FRML MDRD: >90 ML/MIN/1.73M2
GLUCOSE BLD-MCNC: 89 MG/DL (ref 70–125)
HCT VFR BLD AUTO: 42.2 % (ref 35–47)
HDLC SERPL-MCNC: 67 MG/DL
HGB BLD-MCNC: 14.3 G/DL (ref 11.7–15.7)
LDLC SERPL CALC-MCNC: 66 MG/DL
MCH RBC QN AUTO: 30.7 PG (ref 26.5–33)
MCHC RBC AUTO-ENTMCNC: 33.9 G/DL (ref 31.5–36.5)
MCV RBC AUTO: 91 FL (ref 78–100)
PLATELET # BLD AUTO: 206 10E3/UL (ref 150–450)
POTASSIUM BLD-SCNC: 3.7 MMOL/L (ref 3.5–5)
PROLACTIN SERPL-MCNC: 11.4 NG/ML (ref 0–20)
RBC # BLD AUTO: 4.66 10E6/UL (ref 3.8–5.2)
SODIUM SERPL-SCNC: 141 MMOL/L (ref 136–145)
TRIGL SERPL-MCNC: 51 MG/DL
TSH SERPL DL<=0.005 MIU/L-ACNC: 1.41 UIU/ML (ref 0.3–5)
WBC # BLD AUTO: 4.7 10E3/UL (ref 4–11)

## 2021-09-30 PROCEDURE — 87624 HPV HI-RISK TYP POOLED RSLT: CPT | Performed by: NURSE PRACTITIONER

## 2021-09-30 PROCEDURE — 99214 OFFICE O/P EST MOD 30 MIN: CPT | Mod: 25 | Performed by: NURSE PRACTITIONER

## 2021-09-30 PROCEDURE — G0123 SCREEN CERV/VAG THIN LAYER: HCPCS | Performed by: NURSE PRACTITIONER

## 2021-09-30 PROCEDURE — 80048 BASIC METABOLIC PNL TOTAL CA: CPT | Performed by: NURSE PRACTITIONER

## 2021-09-30 PROCEDURE — 36415 COLL VENOUS BLD VENIPUNCTURE: CPT | Performed by: NURSE PRACTITIONER

## 2021-09-30 PROCEDURE — 85027 COMPLETE CBC AUTOMATED: CPT | Performed by: NURSE PRACTITIONER

## 2021-09-30 PROCEDURE — 80061 LIPID PANEL: CPT | Performed by: NURSE PRACTITIONER

## 2021-09-30 PROCEDURE — 84146 ASSAY OF PROLACTIN: CPT | Performed by: NURSE PRACTITIONER

## 2021-09-30 PROCEDURE — 82306 VITAMIN D 25 HYDROXY: CPT | Performed by: NURSE PRACTITIONER

## 2021-09-30 PROCEDURE — 99396 PREV VISIT EST AGE 40-64: CPT | Mod: 25 | Performed by: NURSE PRACTITIONER

## 2021-09-30 PROCEDURE — 84443 ASSAY THYROID STIM HORMONE: CPT | Performed by: NURSE PRACTITIONER

## 2021-09-30 RX ORDER — BUDESONIDE AND FORMOTEROL FUMARATE DIHYDRATE 80; 4.5 UG/1; UG/1
2 AEROSOL RESPIRATORY (INHALATION) DAILY
Qty: 10.2 G | Refills: 11 | Status: SHIPPED | OUTPATIENT
Start: 2021-09-30

## 2021-09-30 ASSESSMENT — MIFFLIN-ST. JEOR: SCORE: 992.55

## 2021-09-30 NOTE — PROGRESS NOTES
SUBJECTIVE:   CC: Hannah Reyna is an 55 year old woman who presents for preventive health visit.     Patient is single, has an adult daughter.  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    Patient has lost some weight this year.  She has cleaned up her diet.  She exercises regularly.    History of asthma.  She is on Symbicort for this.  She recently ran out of the medication and has noticed she has some restricted breathing and has a hard time taking a deep breath.  No cough or wheezing.    Patient has noticed some bladder spasms and urinary urgency.  This usually occurs after taking a long walk, and will sometimes lead to some incontinence.  She denies any dysuria, hematuria, or abdominal bloating.  She has noticed some generalized frequency.    Patient is requesting a Pap today.  She likes to get these done yearly.  She has a history of an endometrial biopsy.  Patient is postmenopausal.    Believes she may have a small hemorrhoid.  She does not have any pain or bleeding.  She can feel it when she wipes.  Her bowel movements are regular and soft.    Patient complains of generalized fatigue.  She is having some stress due to difficulty finding work and hospice.  Her change in diet has helped.  Her sleep can be disrupted at night.    Pertinent history of pituitary microadenoma.  She had an MRI in 2016, which were unchanged from previous.    Patient would like to wait until later next month to receive her flu shot.    Patient has been advised of split billing requirements and indicates understanding: Yes  Healthy Habits:     Getting at least 3 servings of Calcium per day:  Yes    Bi-annual eye exam:  Yes    Dental care twice a year:  Yes    Sleep apnea or symptoms of sleep apnea:  None    Diet:  Regular (no restrictions)    Frequency of exercise:  6-7 days/week    Duration of exercise:  45-60 minutes    Taking medications regularly:  No    Medication side effects:  Not applicable  and None    PHQ-2 Total Score: 0    Additional concerns today:  No      Today's PHQ-2 Score:   PHQ-2 ( 1999 [employer]) 9/29/2021   Q1: Little interest or pleasure in doing things 0   Q2: Feeling down, depressed or hopeless 0   PHQ-2 Score 0   Q1: Little interest or pleasure in doing things Not at all   Q2: Feeling down, depressed or hopeless Not at all   PHQ-2 Score 0       Abuse: Current or Past (Physical, Sexual or Emotional) - No  Do you feel safe in your environment? Yes    Have you ever done Advance Care Planning? (For example, a Health Directive, POLST, or a discussion with a medical provider or your loved ones about your wishes): No, advance care planning information given to patient to review.  Patient plans to discuss their wishes with loved ones or provider.      Social History     Tobacco Use     Smoking status: Never Smoker     Smokeless tobacco: Never Used   Substance Use Topics     Alcohol use: No       Alcohol Use 9/29/2021   Prescreen: >3 drinks/day or >7 drinks/week? Not Applicable       Reviewed orders with patient.  Reviewed health maintenance and updated orders accordingly - Yes      Breast Cancer Screening:  Any new diagnosis of family breast, ovarian, or bowel cancer? No    FHS-7:   Breast CA Risk Assessment (FHS-7) 9/28/2021   Did any of your first-degree relatives have breast or ovarian cancer? No   Did any of your relatives have bilateral breast cancer? No   Did any man in your family have breast cancer? No   Did any woman in your family have breast and ovarian cancer? No   Did any woman in your family have breast cancer before age 50 y? No   Do you have 2 or more relatives with breast and/or ovarian cancer? No   Do you have 2 or more relatives with breast and/or bowel cancer? No       Pertinent mammograms are reviewed under the imaging tab.    History of abnormal Pap smear: NO - age 30-65 PAP every 5 years with negative HPV co-testing recommended  PAP / HPV Latest Ref Rng & Units 8/6/2020  "5/9/2019 4/24/2018   PAP Negative for squamous intraepithelial lesion or malignancy. Negative for squamous intraepithelial lesion or malignancy  Electronically signed by Ana Salguero CT (ASCP) on 8/18/2020 at  3:16 PM   Negative for squamous intraepithelial lesion or malignancy  Electronically signed by Alison Vance CT (ASCP) on 5/13/2019 at  8:42 AM   Negative for squamous intraepithelial lesion or malignancy  Electronically signed by Miley Byrd CT (ASCP) on 4/30/2018 at  2:03 PM     HPV16 NEG Negative - Negative   HPV18 NEG Negative - Negative   HRHPV NEG Negative - Negative     Reviewed and updated as needed this visit by clinical staff  Tobacco  Allergies  Meds  Problems  Med Hx  Surg Hx  Fam Hx          Reviewed and updated as needed this visit by Provider  Tobacco  Allergies  Meds  Problems  Med Hx  Surg Hx  Fam Hx           Review of Systems  Pertinent items in HPI     OBJECTIVE:   /70   Pulse 60   Ht 1.511 m (4' 11.5\")   Wt 48.4 kg (106 lb 11.2 oz)   BMI 21.19 kg/m    Physical Exam  GENERAL: healthy, alert and no distress  EYES: Eyes grossly normal to inspection, PERRL and conjunctivae and sclerae normal. Wearing corrective glasses.  HENT: ear canals and TM's normal, nose and mouth without ulcers or lesions  NECK: no adenopathy, no asymmetry, masses, or scars and thyroid normal to palpation  RESP: lungs clear to auscultation - no rales, rhonchi or wheezes  CV: regular rate and rhythm, normal S1 S2, no S3 or S4, no murmur, click or rub, no peripheral edema  ABDOMEN: soft, nontender, no hepatosplenomegaly, no masses and bowel sounds normal   (female): normal female external genitalia, normal urethral meatus, vaginal mucosa pink, moist, well rugated, and normal cervix/adnexa/uterus without masses or discharge  RECTAL: normal sphincter tone, no rectal masses and 1 external hemorrhoid  MS: no gross musculoskeletal defects noted, no edema  SKIN: no suspicious " "lesions or rashes  NEURO: Normal strength and tone, mentation intact and speech normal  PSYCH: mentation appears normal, affect normal/bright      ASSESSMENT/PLAN:   Hannah was seen today for physical.    Diagnoses and all orders for this visit:    Routine general medical examination at a health care facility    Mild persistent asthma without complication  Well controlled.  -     budesonide-formoterol (SYMBICORT) 80-4.5 MCG/ACT Inhaler; Inhale 2 puffs into the lungs daily    Bladder spasms  -     Physical Therapy Referral; Future    Fatigue, unspecified type  Labs to rule out biologic cause.  -     CBC with platelets  -     Vitamin D Deficiency  -     TSH with free T4 reflex    Screening for lipid disorders  -     Lipid Profile (Chol, Trig, HDL, LDL calc)    Screening for malignant neoplasm of cervix  -     Pap screen with HPV - recommended age 30 - 65 years    Screening for diabetes mellitus  -     Basic metabolic panel    Prolactinoma (H)  It has been a number of years since her Prolactin has been checked.  Will check today in leiu of new fatigue symptoms.   -     Prolactin    Other orders  -     REVIEW OF HEALTH MAINTENANCE PROTOCOL ORDERS        Patient has been advised of split billing requirements and indicates understanding: Yes  COUNSELING:  Reviewed preventive health counseling, as reflected in patient instructions    Estimated body mass index is 21.19 kg/m  as calculated from the following:    Height as of this encounter: 1.511 m (4' 11.5\").    Weight as of this encounter: 48.4 kg (106 lb 11.2 oz).      She reports that she has never smoked. She has never used smokeless tobacco.        Anita Miranda NP  [facility]  "

## 2021-09-30 NOTE — LETTER
My Asthma Action Plan    Name: Hannah Reyna   YOB: 1965  Date: 9/30/2021   My doctor: Anita Miranda NP   My clinic: [facility]        My Control Medicine: Budesonide + formoterol (Symbicort HFA) -  80/4.5 mcg 1 puff twice daily  My Rescue Medicine: Albuterol (Proair/Ventolin/Proventil HFA) 2-4 puffs EVERY 4 HOURS as needed. Use a spacer if recommended by your provider.   My Asthma Severity:   Mild Persistent  Know your asthma triggers:             GREEN ZONE   Good Control    I feel good    No cough or wheeze    Can work, sleep and play without asthma symptoms       Take your asthma control medicine every day.     1. If exercise triggers your asthma, take your rescue medication    15 minutes before exercise or sports, and    During exercise if you have asthma symptoms  2. Spacer to use with inhaler: If you have a spacer, make sure to use it with your inhaler             YELLOW ZONE Getting Worse  I have ANY of these:    I do not feel good    Cough or wheeze    Chest feels tight    Wake up at night   1. Keep taking your Green Zone medications  2. Start taking your rescue medicine:    every 20 minutes for up to 1 hour. Then every 4 hours for 24-48 hours.  3. If you stay in the Yellow Zone for more than 12-24 hours, contact your doctor.  4. If you do not return to the Green Zone in 12-24 hours or you get worse, start taking your oral steroid medicine if prescribed by your provider.           RED ZONE Medical Alert - Get Help  I have ANY of these:    I feel awful    Medicine is not helping    Breathing getting harder    Trouble walking or talking    Nose opens wide to breathe       1. Take your rescue medicine NOW  2. If your provider has prescribed an oral steroid medicine, start taking it NOW  3. Call your doctor NOW  4. If you are still in the Red Zone after 20 minutes and you have not reached your doctor:    Take your rescue medicine again and    Call 911 or go to  the emergency room right away    See your regular doctor within 2 weeks of an Emergency Room or Urgent Care visit for follow-up treatment.          Annual Reminders:  Meet with Asthma Educator,  Flu Shot in the Fall, consider Pneumonia Vaccination for patients with asthma (aged 19 and older).    Pharmacy: [facility]/PHARMACY #[id number] [place], [place] - [address]    Electronically signed by Anita Miranda NP   Date: 09/30/21                      Asthma Triggers  How To Control Things That Make Your Asthma Worse    Triggers are things that make your asthma worse.  Look at the list below to help you find your triggers and what you can do about them.  You can help prevent asthma flare-ups by staying away from your triggers.      Trigger                                                          What you can do   Cigarette Smoke  Tobacco smoke can make asthma worse. Do not allow smoking in your home, car or around you.  Be sure no one smokes at a child s day care or school.  If you smoke, ask your health care provider for ways to help you quit.  Ask family members to quit too.  Ask your health care provider for a referral to Quit Plan to help you quit smoking, or call 5-730-440-PLAN.     Colds, Flu, Bronchitis  These are common triggers of asthma. Wash your hands often.  Don t touch your eyes, nose or mouth.  Get a flu shot every year.     Dust Mites  These are tiny bugs that live in cloth or carpet. They are too small to see. Wash sheets and blankets in hot water every week.   Encase pillows and mattress in dust mite proof covers.  Avoid having carpet if you can. If you have carpet, vacuum weekly.   Use a dust mask and HEPA vacuum.   Pollen and Outdoor Mold  Some people are allergic to trees, grass, or weed pollen, or molds. Try to keep your windows closed.  Limit time out doors when pollen count is high.   Ask you health care provider about taking medicine during allergy season.     Animal Dander  Some people are allergic to  skin flakes, urine or saliva from pets with fur or feathers. Keep pets with fur or feathers out of your home.    If you can t keep the pet outdoors, then keep the pet out of your bedroom.  Keep the bedroom door closed.  Keep pets off cloth furniture and away from stuffed toys.     Mice, Rats, and Cockroaches   Some people are allergic to the waste from these pests.   Cover food and garbage.  Clean up spills and food crumbs.  Store grease in the refrigerator.   Keep food out of the bedroom.   Indoor Mold  This can be a trigger if your home has high moisture. Fix leaking faucets, pipes, or other sources of water.   Clean moldy surfaces.  Dehumidify basement if it is damp and smelly.   Smoke, Strong Odors, and Sprays  These can reduce air quality. Stay away from strong odors and sprays, such as perfume, powder, hair spray, paints, smoke incense, paint, cleaning products, candles and new carpet.   Exercise or Sports  Some people with asthma have this trigger. Be active!  Ask your doctor about taking medicine before sports or exercise to prevent symptoms.    Warm up for 5-10 minutes before and after sports or exercise.     Other Triggers of Asthma  Cold air:  Cover your nose and mouth with a scarf.  Sometimes laughing or crying can be a trigger.  Some medicines and food can trigger asthma.

## 2021-10-01 LAB — DEPRECATED CALCIDIOL+CALCIFEROL SERPL-MC: 88 UG/L (ref 30–80)

## 2021-10-01 ASSESSMENT — ASTHMA QUESTIONNAIRES: ACT_TOTALSCORE: 23

## 2021-10-05 ENCOUNTER — TELEPHONE (OUTPATIENT)
Dept: FAMILY MEDICINE | Facility: CLINIC | Age: 56
End: 2021-10-05

## 2021-10-05 LAB
HUMAN PAPILLOMA VIRUS 16 DNA: NEGATIVE
HUMAN PAPILLOMA VIRUS 18 DNA: NEGATIVE
HUMAN PAPILLOMA VIRUS FINAL DIAGNOSIS: NORMAL
HUMAN PAPILLOMA VIRUS OTHER HR: NEGATIVE

## 2021-10-06 LAB
BKR LAB AP GYN ADEQUACY: NORMAL
BKR LAB AP GYN INTERPRETATION: NORMAL
BKR LAB AP HPV REFLEX: NORMAL
BKR LAB AP PREVIOUS ABNORMAL: NORMAL
PATH REPORT.COMMENTS IMP SPEC: NORMAL
PATH REPORT.RELEVANT HX SPEC: NORMAL

## 2021-10-07 NOTE — TELEPHONE ENCOUNTER
Central Prior Authorization Team   Phone: [phone]    PA Initiation    Medication:   Insurance Company: [employer] - Phone [phone] Fax [phone]  Pharmacy Filling the Rx: [facility]/PHARMACY #[id number] [place], [place] - [address]  Filling Pharmacy Phone: [phone]  Filling Pharmacy Fax: [phone]  Start Date: 10/7/2021

## 2021-10-11 ENCOUNTER — HEALTH MAINTENANCE LETTER (OUTPATIENT)
Age: 56
End: 2021-10-11

## 2022-09-24 ENCOUNTER — HEALTH MAINTENANCE LETTER (OUTPATIENT)
Age: 57
End: 2022-09-24

## 2023-01-29 ENCOUNTER — HEALTH MAINTENANCE LETTER (OUTPATIENT)
Age: 58
End: 2023-01-29

## 2023-12-23 ENCOUNTER — HEALTH MAINTENANCE LETTER (OUTPATIENT)
Age: 58
End: 2023-12-23

## 2024-03-02 ENCOUNTER — HEALTH MAINTENANCE LETTER (OUTPATIENT)
Age: 59
End: 2024-03-02